# Patient Record
Sex: MALE | Race: WHITE | NOT HISPANIC OR LATINO | Employment: OTHER | URBAN - METROPOLITAN AREA
[De-identification: names, ages, dates, MRNs, and addresses within clinical notes are randomized per-mention and may not be internally consistent; named-entity substitution may affect disease eponyms.]

---

## 2017-02-12 ENCOUNTER — TELEPHONE (OUTPATIENT)
Dept: ELECTROPHYSIOLOGY | Facility: CLINIC | Age: 62
End: 2017-02-12

## 2017-03-03 ENCOUNTER — CLINICAL SUPPORT (OUTPATIENT)
Dept: ELECTROPHYSIOLOGY | Facility: CLINIC | Age: 62
End: 2017-03-03
Payer: MEDICARE

## 2017-03-03 DIAGNOSIS — I63.9 STROKE: ICD-10-CM

## 2017-03-03 PROCEDURE — 93299 LOOP RECORDER REMOTE: CPT | Mod: S$GLB,,, | Performed by: INTERNAL MEDICINE

## 2017-03-03 PROCEDURE — 93297 REM INTERROG DEV EVAL ICPMS: CPT | Mod: S$GLB,,, | Performed by: INTERNAL MEDICINE

## 2017-03-10 ENCOUNTER — OFFICE VISIT (OUTPATIENT)
Dept: INTERNAL MEDICINE | Facility: CLINIC | Age: 62
End: 2017-03-10
Payer: MEDICARE

## 2017-03-10 VITALS
DIASTOLIC BLOOD PRESSURE: 80 MMHG | SYSTOLIC BLOOD PRESSURE: 114 MMHG | WEIGHT: 192.44 LBS | BODY MASS INDEX: 29.17 KG/M2 | OXYGEN SATURATION: 97 % | HEART RATE: 75 BPM | HEIGHT: 68 IN

## 2017-03-10 DIAGNOSIS — N28.9 RENAL INSUFFICIENCY: ICD-10-CM

## 2017-03-10 DIAGNOSIS — Z87.891 FORMER SMOKER: ICD-10-CM

## 2017-03-10 DIAGNOSIS — G47.00 INSOMNIA, UNSPECIFIED TYPE: ICD-10-CM

## 2017-03-10 DIAGNOSIS — Z86.73 HISTORY OF CVA (CEREBROVASCULAR ACCIDENT): ICD-10-CM

## 2017-03-10 DIAGNOSIS — Z11.59 NEED FOR HEPATITIS C SCREENING TEST: ICD-10-CM

## 2017-03-10 DIAGNOSIS — I10 ESSENTIAL HYPERTENSION: Primary | ICD-10-CM

## 2017-03-10 DIAGNOSIS — R79.89 ABNORMAL TSH: ICD-10-CM

## 2017-03-10 DIAGNOSIS — G89.29 OTHER CHRONIC PAIN: ICD-10-CM

## 2017-03-10 DIAGNOSIS — J43.8 OTHER EMPHYSEMA: ICD-10-CM

## 2017-03-10 DIAGNOSIS — Z72.0 TOBACCO USE: ICD-10-CM

## 2017-03-10 PROCEDURE — 1160F RVW MEDS BY RX/DR IN RCRD: CPT | Mod: S$GLB,,, | Performed by: INTERNAL MEDICINE

## 2017-03-10 PROCEDURE — 3079F DIAST BP 80-89 MM HG: CPT | Mod: S$GLB,,, | Performed by: INTERNAL MEDICINE

## 2017-03-10 PROCEDURE — 3074F SYST BP LT 130 MM HG: CPT | Mod: S$GLB,,, | Performed by: INTERNAL MEDICINE

## 2017-03-10 PROCEDURE — 99999 PR PBB SHADOW E&M-EST. PATIENT-LVL IV: CPT | Mod: PBBFAC,,, | Performed by: INTERNAL MEDICINE

## 2017-03-10 PROCEDURE — 99396 PREV VISIT EST AGE 40-64: CPT | Mod: S$GLB,,, | Performed by: INTERNAL MEDICINE

## 2017-03-10 RX ORDER — AMOXICILLIN 500 MG/1
500 TABLET, FILM COATED ORAL EVERY 12 HOURS
COMMUNITY
End: 2017-03-10

## 2017-03-10 RX ORDER — HYDROCODONE BITARTRATE AND ACETAMINOPHEN 7.5; 325 MG/1; MG/1
1 TABLET ORAL 2 TIMES DAILY PRN
Qty: 60 TABLET | Refills: 0 | Status: ON HOLD | OUTPATIENT
Start: 2017-03-10 | End: 2017-05-09

## 2017-03-10 RX ORDER — CLOPIDOGREL BISULFATE 75 MG/1
75 TABLET ORAL DAILY
Qty: 90 TABLET | Refills: 3 | Status: SHIPPED | OUTPATIENT
Start: 2017-03-10 | End: 2017-03-17 | Stop reason: SDUPTHER

## 2017-03-10 RX ORDER — ATORVASTATIN CALCIUM 40 MG/1
40 TABLET, FILM COATED ORAL DAILY
Qty: 90 TABLET | Refills: 3 | Status: SHIPPED | OUTPATIENT
Start: 2017-03-10 | End: 2017-05-31 | Stop reason: SDUPTHER

## 2017-03-10 RX ORDER — PREGABALIN 75 MG/1
75 CAPSULE ORAL 2 TIMES DAILY
COMMUNITY

## 2017-03-10 RX ORDER — HYDROCODONE BITARTRATE AND ACETAMINOPHEN 7.5; 325 MG/1; MG/1
1 TABLET ORAL 3 TIMES DAILY PRN
Qty: 90 TABLET | Refills: 0 | Status: SHIPPED | OUTPATIENT
Start: 2017-03-10 | End: 2017-03-10 | Stop reason: SDUPTHER

## 2017-03-10 RX ORDER — BACLOFEN 20 MG/1
20 TABLET ORAL 3 TIMES DAILY
COMMUNITY

## 2017-03-10 RX ORDER — DOXYCYCLINE HYCLATE 100 MG
100 TABLET ORAL 2 TIMES DAILY
COMMUNITY
End: 2017-03-10

## 2017-03-10 RX ORDER — DOXEPIN HYDROCHLORIDE 25 MG/1
25 CAPSULE ORAL NIGHTLY
COMMUNITY
End: 2017-03-10

## 2017-03-10 RX ORDER — BUDESONIDE AND FORMOTEROL FUMARATE DIHYDRATE 80; 4.5 UG/1; UG/1
2 AEROSOL RESPIRATORY (INHALATION) 2 TIMES DAILY
Qty: 1 INHALER | Refills: 5 | Status: SHIPPED | OUTPATIENT
Start: 2017-03-10 | End: 2018-03-10

## 2017-03-10 RX ORDER — BUPROPION HYDROCHLORIDE 100 MG/1
150 TABLET ORAL 2 TIMES DAILY
COMMUNITY
End: 2017-03-10

## 2017-03-10 NOTE — PROGRESS NOTES
Subjective:       Patient ID: Milad Blanco is a 61 y.o. male.    Chief Complaint: Annual Exam    HPI Comments:   New pt.     Former pt of Dr Hinds, here to establish care.      He is accompanied by his wife, who is also switching care to here.     Feeling well today.  No c/o.     PMH  -H/o 3 strokes.  Most recent was 12/2015.  First one was approx 2 yrs ago.  All have affected L side.  He has some residual L sided weakness.  He uses a cane on occasion.  Drags his LLE.  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  Not following w/Neuro.    -Obstructing L kidney stone 3/2015 with CT also showing multiple other non-obstructing stones B  -Echo showing RVH, LANCE, DD  -H/o MI within the past 2 yrs.  No stents.  Pt has AICD.     -Chronic lumbar back pain.  Pt says he has cracked vertebra L4 and L5.  Had NIKO which pt says went badly and he doesn't want more of these.      Quit tobacco 1/1/2017    Pulls his own teeth and is requesting abx to have on hand for when he does this.          Review of Systems   Constitutional: Negative.    HENT: Negative.    Eyes: Negative.    Respiratory: Negative.    Cardiovascular: Negative.    Gastrointestinal: Negative.    Genitourinary: Negative.    Musculoskeletal: Negative.    Skin: Negative.    Neurological: Negative.    Psychiatric/Behavioral: Negative.        Past Medical History:   Diagnosis Date    Anxiety 2/5/2013    Back pain 2/5/2013    Chronic back pain 2/28/2016    CKD (chronic kidney disease) stage 3, GFR 30-59 ml/min 2/28/2016    COPD (chronic obstructive pulmonary disease) 2/5/2013    Fatigue 2/5/2013    History of CVA (cerebrovascular accident) 2/5/2013 8/11    HTN (hypertension) 11/14/2013    Hyperlipidemia     Hypertension     Insomnia 2/5/2013    Neuropathy 2/28/2016    PVD (peripheral vascular disease) 2/5/2013    Sleep apnea 2/5/2013    Tobacco use 2/5/2013       Past Surgical History:   Procedure Laterality Date    gall stones       "loop recorder  04/2016       Family History   Problem Relation Age of Onset    Diabetes Mother     Alcohol abuse Father     Heart disease Brother     Nephrolithiasis Brother        Social History     Social History    Marital status:      Spouse name: N/A    Number of children: N/A    Years of education: N/A     Occupational History    Retired       Social History Main Topics    Smoking status: Former Smoker     Types: Cigars     Quit date: 2/2/2013    Smokeless tobacco: None    Alcohol use 12.0 oz/week     20 Cans of beer per week      Comment: socially    Drug use: No    Sexual activity: Not Asked     Other Topics Concern    None     Social History Narrative    Lives w/wife.  Pt's sister also lives w/them.  Has 2 grown sons.       Objective:       Vitals:    03/10/17 0822   BP: 114/80   Pulse: 75   SpO2: 97%   Weight: 87.3 kg (192 lb 7.4 oz)   Height: 5' 8" (1.727 m)     Physical Exam   Constitutional: He is oriented to person, place, and time. He appears well-developed and well-nourished.   HENT:   Head: Normocephalic and atraumatic.   Right Ear: Tympanic membrane, external ear and ear canal normal.   Left Ear: Tympanic membrane, external ear and ear canal normal.   Mouth/Throat: Uvula is midline and oropharynx is clear and moist. No oropharyngeal exudate or posterior oropharyngeal erythema.   Eyes: Conjunctivae and EOM are normal. Pupils are equal, round, and reactive to light.   Neck: Normal range of motion. Neck supple. No thyromegaly present.   Cardiovascular: Normal rate, regular rhythm and normal heart sounds.  Exam reveals no gallop and no friction rub.    No murmur heard.  Pulmonary/Chest: Effort normal and breath sounds normal. He has no wheezes. He has no rhonchi. He has no rales.   Abdominal: Soft. Bowel sounds are normal. He exhibits no distension. There is no tenderness. There is no rebound and no guarding.   Musculoskeletal: Normal range of motion. He exhibits no " edema or tenderness.   Lymphadenopathy:     He has no cervical adenopathy.   Neurological: He is alert and oriented to person, place, and time. He has normal reflexes. He displays no atrophy and no tremor. No cranial nerve deficit or sensory deficit. He exhibits normal muscle tone. Coordination normal.   4+/5 strength in L hip flexion.  5/5 L knee extension.  4+/5 L dorsiflexion.    5/5 strength throughout BUE   Skin: Skin is warm and dry. No rash noted.   Psychiatric: He has a normal mood and affect. His behavior is normal. Thought content normal.       Assessment:       1. Essential hypertension    2. History of CVA (cerebrovascular accident)    3. Other emphysema    4. Renal insufficiency    5. Tobacco use    6. Former smoker    7. Insomnia, unspecified type    8. Abnormal TSH    9. Other chronic pain    10. Need for hepatitis C screening test        Plan:           HTN - Appears to be an erroneous dx as BP is normal w/o tx.      H/o strokes - Cont with tertiary prevention.  Pt recently quit smoking.  BP is normal w/o meds.  His chart erroneously states he has HTN.      COPD - Pt is restricting his activity b/c this provokes ARANGO.  Try Symbicort.  Cont PRN albuterol.      Former smoker - Pt quit smoking.  Pt congratulated on this and encouraged to stay quit.      Renal insufficiency - Seen on review of labs from 6 mos ago with no follow-up.  Repeat this now.      Insomnia - Pt states Seroquel was not effective and he is having less insomnia lately.  D/c seroquel and cont to monitor.     Depression and anxiety - Pt says he had run out of bupropion and felt the same, so he doesn't feel it was helpful.  Not feeling depressed currently.  D/c bupropion.  D/c Doxepin.      Abnormal TSH - Seen on review of labs.  Repeat this now.     Chronic lumbar back pain - Pt refused referral to [facility] Sp[facility], PT.  I advised [facility] that I do not think chronic use of opiates is the best tx for his back pain and that I would refill his  hydrocodone-APAP only today, and for a decreased amount.  Pt referred to Pain Mgmt.

## 2017-03-10 NOTE — MR AVS SNAPSHOT
[facility]  [address]  [place] [address]  Phone: [phone]  Fax: [phone]                  Milad Blanco   3/10/2017 9:40 AM   Office Visit    Description:  Male : 1955   Provider:  Kirk Nair MD   Department:  [facility]           Reason for Visit     Annual Exam           Diagnoses this Visit        Comments    Essential hypertension    -  Primary     History of CVA (cerebrovascular accident)         Other emphysema         Tobacco use         Renal insufficiency         Need for hepatitis C screening test         Former smoker         Insomnia, unspecified type         Abnormal TSH         Other chronic pain                To Do List           Future Appointments        Provider Department Dept Phone    3/10/2017 3:45 PM LAB, [facility] [place] [phone]    3/13/2017 7:15 AM Zahira Bragg MD [facility] [phone]    2017 8:40 AM Kirk Nair MD [facility] [phone]      Goals (5 Years of Data)     None      Follow-Up and Disposition     Return in about 3 months (around 6/10/2017), or if symptoms worsen or fail to improve.       These Medications        Disp Refills Start End    budesonide-formoterol 80-4.5 mcg (SYMBICORT) 80-4.5 mcg/actuation HFAA 1 Inhaler 5 3/10/2017 3/10/2018    Inhale 2 puffs into the lungs 2 (two) times daily. Controller - Inhalation    Pharmacy: [id number] - [place] [address] Ph #: [phone]       clopidogrel (PLAVIX) 75 mg tablet 90 tablet 3 3/10/2017 3/10/2018    Take 1 tablet (75 mg total) by mouth once daily. - Oral    Pharmacy: [id number] - [place] [address] Ph #: [phone]       atorvastatin (LIPITOR) 40 MG tablet 90 tablet 3 3/10/2017 3/10/2018    Take 1 tablet (40 mg total) by mouth once daily. - Oral    Pharmacy: [id number]  [place] [address] Ph #:  [phone]       hydrocodone-acetaminophen 7.5-325mg (NORCO) 7.5-325 mg per tablet 60 tablet 0 3/10/2017     Take 1 tablet by mouth 2 (two) times daily as needed for Pain. - Oral    Pharmacy: [facility] #[id number] - [place] - [address] [facility] #: [phone]         [facility] On Call     [facility] On Call Nurse Care Line - 24/7 Assistance  Registered nurses in the [facility] On Call Center provide clinical advisement, health education, appointment booking, and other advisory services.  Call for this free service at [phone].             Medications           Message regarding Medications     Verify the changes and/or additions to your medication regime listed below are the same as discussed with your clinician today.  If any of these changes or additions are incorrect, please notify your healthcare provider.        START taking these NEW medications        Refills    budesonide-formoterol 80-4.5 mcg (SYMBICORT) 80-4.5 mcg/actuation HFAA 5    Sig: Inhale 2 puffs into the lungs 2 (two) times daily. Controller    Class: Normal    Route: Inhalation      CHANGE how you are taking these medications     Start Taking Instead of    hydrocodone-acetaminophen 7.5-325mg (NORCO) 7.5-325 mg per tablet hydrocodone-acetaminophen 7.5-325mg (NORCO) 7.5-325 mg per tablet    Dosage:  Take 1 tablet by mouth 2 (two) times daily as needed for Pain. Dosage:  Take 1 tablet by mouth 3 (three) times daily as needed for Pain.    Reason for Change:  Reorder       STOP taking these medications     gabapentin (NEURONTIN) 300 MG capsule TAKE ONE TO TWO CAPSULES BY MOUTH EVERY NIGHT AT BEDTIME AS NEEDED    tamsulosin (FLOMAX) 0.4 mg Cp24 TAKE ONE CAPSULE BY MOUTH DAILY    quetiapine (SEROQUEL) 25 MG Tab Take 1 tablet (25 mg total) by mouth every evening.    buPROPion (WELLBUTRIN) 100 MG tablet Take 150 mg by mouth 2 (two) times daily.    doxycycline (VIBRA-TABS) 100 MG tablet Take 100 mg by mouth 2 (two) times daily.    amoxicillin (AMOXIL)  "500 MG Tab Take 500 mg by mouth every 12 (twelve) hours.    doxepin (SINEQUAN) 25 MG capsule Take 25 mg by mouth every evening.           Verify that the below list of medications is an accurate representation of the medications you are currently taking.  If none reported, the list may be blank. If incorrect, please contact your healthcare provider. Carry this list with you in case of emergency.           Current Medications     aspirin 325 MG tablet Take 1 tablet (325 mg total) by mouth once daily.    atorvastatin (LIPITOR) 40 MG tablet Take 1 tablet (40 mg total) by mouth once daily.    baclofen (LIORESAL) 20 MG tablet Take 20 mg by mouth 3 (three) times daily.    clopidogrel (PLAVIX) 75 mg tablet Take 1 tablet (75 mg total) by mouth once daily.    hydrocodone-acetaminophen 7.5-325mg (NORCO) 7.5-325 mg per tablet Take 1 tablet by mouth 2 (two) times daily as needed for Pain.    pregabalin (LYRICA) 75 MG capsule Take 75 mg by mouth 2 (two) times daily.    albuterol (VENTOLIN HFA) 90 mcg/actuation inhaler INHALE TWO PUFFS BY MOUTH THREE TIMES A DAY AS NEEDED    budesonide-formoterol 80-4.5 mcg (SYMBICORT) 80-4.5 mcg/actuation HFAA Inhale 2 puffs into the lungs 2 (two) times daily. Controller           Clinical Reference Information           Your Vitals Were     BP Pulse Height Weight SpO2 BMI    114/80 75 5' 8" (1.727 m) 87.3 kg (192 lb 7.4 oz) 97% 29.26 kg/m2      Blood Pressure          Most Recent Value    BP  114/80      Allergies as of 3/10/2017     Pcn [Penicillins]      Immunizations Administered on Date of Encounter - 3/10/2017     None      Orders Placed During Today's Visit      Normal Orders This Visit    Ambulatory Referral to Pain Clinic     Future Labs/Procedures Expected by Expires    Basic metabolic panel  3/10/2017 5/9/2018    Hepatitis C antibody  3/10/2017 4/25/2018    TSH  3/10/2017 5/9/2018      [facility] Sign-Up     Activating your [facility] account is as easy as 1-2-3!     1) Visit " [email or web address], select Sign Up Now, enter this activation code and your date of birth, then select Next.  [email or web address]  Expires: 4/24/2017 10:33 AM      2) Create a username and password to use when you visit [facility] in the future and select a security question in case you lose your password and select Next.    3) Enter your e-mail address and click Sign Up!    Additional Information  If you have questions, please e-mail "[email or web address] or call [phone] to talk to our [facility] staff. Remember, [facility] is NOT to be used for urgent needs. For medical emergencies, dial 911.         Language Assistance Services     ATTENTION: Language assistance services are available, free of charge. Please call [phone].      ATENCIÓN: Si habla español, tiene a jade disposición servicios gratuitos de asistencia lingüística. Llame al [phone].     CHÚ Ý: N?u b?n nói Ti?ng Vi?t, có các d?ch v? h? tr? ngôn ng? mi?n phí dành cho b?n. G?i s? [phone].         [facility] complies with applicable Federal civil rights laws and does not discriminate on the basis of race, color, national origin, age, disability, or sex.

## 2017-03-17 RX ORDER — CLOPIDOGREL BISULFATE 75 MG/1
75 TABLET ORAL DAILY
Qty: 90 TABLET | Refills: 3 | Status: SHIPPED | OUTPATIENT
Start: 2017-03-17 | End: 2017-05-31 | Stop reason: SDUPTHER

## 2017-03-17 RX ORDER — BUPROPION HYDROCHLORIDE 150 MG/1
150 TABLET, EXTENDED RELEASE ORAL 2 TIMES DAILY
Qty: 180 TABLET | Refills: 3 | Status: CANCELLED | OUTPATIENT
Start: 2017-03-17

## 2017-03-17 NOTE — TELEPHONE ENCOUNTER
----- Message from Candice Arzate sent at 3/17/2017 10:01 AM CDT -----  Contact: pt wife   x_  1st Request  _  2nd Request  _  3rd Request    Please refill the medication(s) listed below.     Medication #1 wellbutrin 150mg      Medication #2clopidogrel (PLAVIX) 75 mg tablet        Preferred Pharmacy:[id number]  [place]

## 2017-03-19 DIAGNOSIS — Z95.818 STATUS POST PLACEMENT OF IMPLANTABLE LOOP RECORDER: Primary | ICD-10-CM

## 2017-03-19 DIAGNOSIS — I63.9 CRYPTOGENIC STROKE: ICD-10-CM

## 2017-04-04 ENCOUNTER — CLINICAL SUPPORT (OUTPATIENT)
Dept: ELECTROPHYSIOLOGY | Facility: CLINIC | Age: 62
End: 2017-04-04
Payer: MEDICARE

## 2017-04-04 DIAGNOSIS — I63.9 CRYPTOGENIC STROKE: ICD-10-CM

## 2017-04-04 DIAGNOSIS — Z95.818 STATUS POST PLACEMENT OF IMPLANTABLE LOOP RECORDER: ICD-10-CM

## 2017-04-04 PROCEDURE — 93297 REM INTERROG DEV EVAL ICPMS: CPT | Mod: S$GLB,,, | Performed by: INTERNAL MEDICINE

## 2017-04-04 PROCEDURE — 93299 LOOP RECORDER REMOTE: CPT | Mod: S$GLB,,, | Performed by: INTERNAL MEDICINE

## 2017-05-05 ENCOUNTER — HOSPITAL ENCOUNTER (OUTPATIENT)
Dept: PREADMISSION TESTING | Facility: HOSPITAL | Age: 62
Discharge: HOME | End: 2017-05-05
Attending: ORTHOPAEDIC SURGERY
Payer: MEDICARE

## 2017-05-05 VITALS
DIASTOLIC BLOOD PRESSURE: 79 MMHG | OXYGEN SATURATION: 98 % | HEART RATE: 84 BPM | RESPIRATION RATE: 17 BRPM | SYSTOLIC BLOOD PRESSURE: 112 MMHG | BODY MASS INDEX: 30.8 KG/M2 | TEMPERATURE: 98 F | WEIGHT: 203.25 LBS | HEIGHT: 68 IN

## 2017-05-05 DIAGNOSIS — Z01.818 PRE-OP TESTING: Primary | ICD-10-CM

## 2017-05-05 LAB
ANION GAP SERPL CALC-SCNC: 8 MMOL/L
BASOPHILS # BLD AUTO: 0.07 K/UL
BASOPHILS NFR BLD: 0.5 %
BUN SERPL-MCNC: 14 MG/DL
CALCIUM SERPL-MCNC: 8.8 MG/DL
CHLORIDE SERPL-SCNC: 106 MMOL/L
CO2 SERPL-SCNC: 27 MMOL/L
CREAT SERPL-MCNC: 0.9 MG/DL
DIFFERENTIAL METHOD: ABNORMAL
EOSINOPHIL # BLD AUTO: 0.2 K/UL
EOSINOPHIL NFR BLD: 1.6 %
ERYTHROCYTE [DISTWIDTH] IN BLOOD BY AUTOMATED COUNT: 14.2 %
EST. GFR  (AFRICAN AMERICAN): >60 ML/MIN/1.73 M^2
EST. GFR  (NON AFRICAN AMERICAN): >60 ML/MIN/1.73 M^2
GLUCOSE SERPL-MCNC: 74 MG/DL
HCT VFR BLD AUTO: 43.3 %
HGB BLD-MCNC: 14.3 G/DL
LYMPHOCYTES # BLD AUTO: 4.7 K/UL
LYMPHOCYTES NFR BLD: 34.8 %
MCH RBC QN AUTO: 30.3 PG
MCHC RBC AUTO-ENTMCNC: 33 %
MCV RBC AUTO: 92 FL
MONOCYTES # BLD AUTO: 1.1 K/UL
MONOCYTES NFR BLD: 8.3 %
NEUTROPHILS # BLD AUTO: 7.3 K/UL
NEUTROPHILS NFR BLD: 54.5 %
PLATELET # BLD AUTO: 376 K/UL
PMV BLD AUTO: 10.3 FL
POTASSIUM SERPL-SCNC: 4 MMOL/L
RBC # BLD AUTO: 4.72 M/UL
SODIUM SERPL-SCNC: 141 MMOL/L
WBC # BLD AUTO: 13.45 K/UL

## 2017-05-05 PROCEDURE — 93005 ELECTROCARDIOGRAM TRACING: CPT

## 2017-05-05 PROCEDURE — 36415 COLL VENOUS BLD VENIPUNCTURE: CPT

## 2017-05-05 PROCEDURE — 85025 COMPLETE CBC W/AUTO DIFF WBC: CPT

## 2017-05-05 PROCEDURE — 80048 BASIC METABOLIC PNL TOTAL CA: CPT

## 2017-05-05 RX ORDER — DOXEPIN HYDROCHLORIDE 25 MG/1
25 CAPSULE ORAL NIGHTLY
COMMUNITY
End: 2017-05-31

## 2017-05-05 RX ORDER — NITROGLYCERIN 0.4 MG/1
0.4 TABLET SUBLINGUAL EVERY 5 MIN PRN
COMMUNITY

## 2017-05-05 RX ORDER — QUETIAPINE FUMARATE 25 MG/1
25 TABLET, FILM COATED ORAL DAILY
COMMUNITY
End: 2017-05-31

## 2017-05-05 RX ORDER — MULTIVITAMIN
1 TABLET ORAL DAILY
COMMUNITY

## 2017-05-05 NOTE — IP AVS SNAPSHOT
[place] [address] [place] [address]  Phone: [phone]           Patient Discharge Instructions  Our goal is to set you up for success. This packet includes information on your condition, medications, and your home care. It will help you care for yourself to prevent having to return to the hospital.     Please ask your nurse if you have any questions.      There are many details to remember when preparing for your surgery. Here is what you will need to do, please ask your nurse if there are more specific instructions and if you have any questions:    1. Before procedure Do not smoke or drink alcoholic beverages 24 hours prior to your procedure. Do not eat or drink anything 8 hours before your procedure - this includes gum, mints, and candy.     2. Day of procedure Please remove all jewelry for the procedure. If you wear contact lenses, dentures, hearing aids or glasses, bring a container to put them in during your surgery and give to a family member.  If your doctor has scheduled you for an overnight stay, bring a small overnight bag with any personal items that you need.      3. After procedure  Make arrangements in advance for transportation home by a responsible adult. It is not safe to drive a vehicle during the 24 hours following surgery.     PLEASE NOTE: You may be contacted the day before your surgery to confirm your surgery date and arrival time. The Surgery schedule has many variables which may affect the time of your surgery case. Family members should be available if your surgery time changes.               ** Verify the list of medication(s) below is accurate and up to date. Carry this with you in case of emergency. If your medications have changed, please notify your healthcare provider.             Medication List      TAKE these medications        Additional Info                      albuterol 90 mcg/actuation inhaler   Commonly known as:  VENTOLIN HFA    Quantity:  18 g   Refills:  4    Instructions:  INHALE TWO PUFFS BY MOUTH THREE TIMES A DAY AS NEEDED     Begin Date    AM    Noon    PM    Bedtime       aspirin 325 MG tablet   Refills:  0   Dose:  325 mg    Instructions:  Take 1 tablet (325 mg total) by mouth once daily.     Begin Date    AM    Noon    PM    Bedtime       atorvastatin 40 MG tablet   Commonly known as:  LIPITOR   Quantity:  90 tablet   Refills:  3   Dose:  40 mg    Instructions:  Take 1 tablet (40 mg total) by mouth once daily.     Begin Date    AM    Noon    PM    Bedtime       baclofen 20 MG tablet   Commonly known as:  LIORESAL   Refills:  0   Dose:  20 mg    Instructions:  Take 20 mg by mouth 3 (three) times daily.     Begin Date    AM    Noon    PM    Bedtime       budesonide-formoterol 80-4.5 mcg 80-4.5 mcg/actuation Hfaa   Commonly known as:  SYMBICORT   Quantity:  1 Inhaler   Refills:  5   Dose:  2 puff    Instructions:  Inhale 2 puffs into the lungs 2 (two) times daily. Controller     Begin Date    AM    Noon    PM    Bedtime       clopidogrel 75 mg tablet   Commonly known as:  PLAVIX   Quantity:  90 tablet   Refills:  3   Dose:  75 mg    Instructions:  Take 1 tablet (75 mg total) by mouth once daily.     Begin Date    AM    Noon    PM    Bedtime       doxepin 25 MG capsule   Commonly known as:  SINEQUAN   Refills:  0   Dose:  25 mg    Instructions:  Take 25 mg by mouth every evening.     Begin Date    AM    Noon    PM    Bedtime       hydrocodone-acetaminophen 7.5-325mg 7.5-325 mg per tablet   Commonly known as:  NORCO   Quantity:  60 tablet   Refills:  0   Dose:  1 tablet    Instructions:  Take 1 tablet by mouth 2 (two) times daily as needed for Pain.     Begin Date    AM    Noon    PM    Bedtime       nitroGLYCERIN 0.4 MG SL tablet   Commonly known as:  NITROSTAT   Refills:  0   Dose:  0.4 mg    Instructions:  Place 0.4 mg under the tongue every 5 (five) minutes as needed for Chest pain.     Begin Date    AM    Noon    PM    Bedtime  "      ONE DAILY MULTIVITAMIN per tablet   Refills:  0   Dose:  1 tablet   Generic drug:  multivitamin    Instructions:  Take 1 tablet by mouth once daily.     Begin Date    AM    Noon    PM    Bedtime       pregabalin 75 MG capsule   Commonly known as:  LYRICA   Refills:  0   Dose:  75 mg    Instructions:  Take 75 mg by mouth 2 (two) times daily.     Begin Date    AM    Noon    PM    Bedtime       quetiapine 25 MG Tab   Commonly known as:  SEROQUEL   Refills:  0   Dose:  25 mg    Instructions:  Take 25 mg by mouth once daily.     Begin Date    AM    Noon    PM    Bedtime                  Please bring to all follow up appointments:    1. A copy of your discharge instructions.  2. All medicines you are currently taking in their original bottles.  3. Identification and insurance card.    Please arrive 15 minutes ahead of scheduled appointment time.    Please call 24 hours in advance if you must reschedule your appointment and/or time.        Your Scheduled Appointments     Jun 12, 2017  8:40 AM CDT   Established Patient Visit with Kirk Nair MD   [facility] ([facility])    [address] [place] [address] [phone]              Your Future Surgeries/Procedures     May 09, 2017   Surgery with Jose Florentino MD   [facility] ([facility])    [facility] [address] [place] [address] [phone]                  Discharge Instructions         Your surgery is scheduled for _Tuesday May 9, 2017____.    Call [phone] between 2 p.m. and 5 p.m. on   _Monday___ to find out your arrival time for the day of your surgery.      Please report to SAME DAY SURGERY UNIT on the 2nd FLOOR at _______ a.m.  Use front door entrance. The doors open at 0530 am.      If you need WHEELCHAIR assistance please call  [phone] from your cell phone or "0"  from the  hospital courtesy phone located to the right after you enter the hospital lobby.      INSTRUCTIONS " IMPORTANT!!!  ¨ Do not eat or drink after 12 midnight-including water. OK to brush teeth, no   gum, candy or mints!    ¨ Take only these medicines with a small swallow of water-morning of surgery.  Take Multaq and Flomax with small swallow of water am of surgery.        _x___  Prep instructions:    SHOWER    _x___  Please shower using Hibiclens soap the night before AND  the morning of your surgery/procedure. Do not use Hibiclens on your face or genitals     _x___  No powder, lotions or creams to your body.  _x___  You may wear only deodorant on the day of surgery.  _x___  Please remove all jewelry, including piercings and leave at home.  _x___  No money or valuables needed. Please leave at home.  You may bring your cell phone.    _x___  If going home the same day, arrange for a ride home. You will not be able to   drive if Anesthesia was used.    _x___  Wear loose fitting clothing. Allow for dressings, bandages.  _x___  Stop Aspirin, Ibuprofen, Motrin and Aleve at least -5 days before surgery, unless otherwise instructed by your doctor, or the nurse.              You MAY use Tylenol/acetaminophen until day of surgery.    _x___  Call MD for temperature above 101 degrees.        _x___ Stop taking any Fish Oil supplement or any Vitamins that contain Vitamin E at least 5 days prior to surgery.          I have read or had read and explained to me, and understand the above information.  Additional comments or instructions:Please call   [phone] if you have any questions regarding the instructions above.                 Admission Information     Date & Time Provider Department CSN    5/5/2017  3:00 PM Jose Florentino MD [facility] [id number]      Care Providers     Provider Role Specialty Primary office phone    Jose Florentino MD Attending Provider Orthopedic Surgery [phone]      Your Vitals Were     BP Pulse Temp Resp Height Weight    112/79 (BP Location: Left arm, Patient Position: Sitting, BP  "Method: Automatic) 84 98.1 °F (36.7 °C) (Oral) 17 5' 8" (1.727 m) 92.2 kg (203 lb 4.2 oz)    SpO2 BMI             98% 30.91 kg/m2         Recent Lab Values        8/24/2011 8/30/2013 12/30/2015                     5:10 AM  6:05 AM  3:17 PM         A1C 5.5 6.1 5.5                   Allergies as of 5/5/2017        Reactions    Pcn [Penicillins]       [facility]     [facility] Nurse Care Line - 24/7 Assistance  Unless otherwise directed by your provider, please contact [facility], our nurse care line that is available for 24/7 assistance.     Registered nurses in the [facility] provide clinical advisement, health education, appointment booking, and other advisory services.  Call for this free service at [phone].        Advance Directives     An advance directive is a document which, in the event you are no longer able to make decisions for yourself, tells your healthcare team what kind of treatment you do or do not want to receive, or who you would like to make those decisions for you.  If you do not currently have an advance directive, [facility] encourages you to create one.  For more information call:  [phone] ([phone]), [phone] ([phone]),  or log on to [email or web address].        Smoking Cessation     If you would like to quit smoking:   You may be eligible for free services if you are a [place] resident and started smoking cigarettes before September 1, 1988.  Call the Smoking Cessation Trust (SCT) toll free at (210) 677-3893 or [phone]) 355-7631.   Call 4-212-QUIT-NOW if you do not meet the above criteria.   Contact us via email: [email or web address]   View our website for more information: [email or web address]        Language Assistance Services     ATTENTION: Language assistance services are available, free of charge. Please call [phone].      ATENCIÓN: Si habla español, tiene a jade disposición servicios gratuitos de asistencia " shakaa. James hood [phone].     MARTIN Ý: N?u b?n nói Ti?ng Vi?t, có các d?ch v? h? tr? ngôn ng? mi?n phí dành cho b?n. G?i s? [phone].        Stroke Education              Chronic Kindey Disease Education             [facility] Sign-Up     Activating your [facility] account is as easy as 1-2-3!     1) Visit [email or web address], select Sign Up Now, enter this activation code and your date of birth, then select Next.  [email or web address]  Expires: 6/19/2017  4:15 PM      2) Create a username and password to use when you visit [facility] in the future and select a security question in case you lose your password and select Next.    3) Enter your e-mail address and click Sign Up!    Additional Information  If you have questions, please e-mail [email or web address] or call [phone] to talk to our [facility] staff. Remember, [facility] is NOT to be used for urgent needs. For medical emergencies, dial 911.          [facility] complies with applicable Federal civil rights laws and does not discriminate on the basis of race, color, national origin, age, disability, or sex.

## 2017-05-05 NOTE — DISCHARGE INSTRUCTIONS
"  Your surgery is scheduled for _Tuesday May 9, 2017____.    Call [phone] between 2 p.m. and 5 p.m. on   _Monday___ to find out your arrival time for the day of your surgery.      Please report to SAME DAY SURGERY UNIT on the 2nd FLOOR at _______ a.m.  Use front door entrance. The doors open at 0530 am.      If you need WHEELCHAIR assistance please call  [phone] from your cell phone or "0"  from the  hospital courtesy phone located to the right after you enter the hospital lobby.      INSTRUCTIONS IMPORTANT!!!  ¨ Do not eat or drink after 12 midnight-including water. OK to brush teeth, no   gum, candy or mints!    ¨ Take only these medicines with a small swallow of water-morning of surgery.  Take Multaq and Flomax with small swallow of water am of surgery.        _x___  Prep instructions:    SHOWER    _x___  Please shower using Hibiclens soap the night before AND  the morning of your surgery/procedure. Do not use Hibiclens on your face or genitals     _x___  No powder, lotions or creams to your body.  _x___  You may wear only deodorant on the day of surgery.  _x___  Please remove all jewelry, including piercings and leave at home.  _x___  No money or valuables needed. Please leave at home.  You may bring your cell phone.    _x___  If going home the same day, arrange for a ride home. You will not be able to   drive if Anesthesia was used.    _x___  Wear loose fitting clothing. Allow for dressings, bandages.  _x___  Stop Aspirin, Ibuprofen, Motrin and Aleve at least -5 days before surgery, unless otherwise instructed by your doctor, or the nurse.              You MAY use Tylenol/acetaminophen until day of surgery.    _x___  Call MD for temperature above 101 degrees.        _x___ Stop taking any Fish Oil supplement or any Vitamins that contain Vitamin E at least 5 days prior to surgery.          I have read or had read and explained to me, and understand the above information.  Additional comments or " instructions:Please call   [phone] if you have any questions regarding the instructions above.

## 2017-05-08 ENCOUNTER — ANESTHESIA EVENT (OUTPATIENT)
Dept: SURGERY | Facility: HOSPITAL | Age: 62
End: 2017-05-08
Payer: MEDICARE

## 2017-05-09 ENCOUNTER — ANESTHESIA (OUTPATIENT)
Dept: SURGERY | Facility: HOSPITAL | Age: 62
End: 2017-05-09
Payer: MEDICARE

## 2017-05-09 ENCOUNTER — HOSPITAL ENCOUNTER (OUTPATIENT)
Facility: HOSPITAL | Age: 62
Discharge: HOME | End: 2017-05-09
Attending: ORTHOPAEDIC SURGERY | Admitting: ORTHOPAEDIC SURGERY
Payer: MEDICARE

## 2017-05-09 VITALS
OXYGEN SATURATION: 97 % | SYSTOLIC BLOOD PRESSURE: 150 MMHG | WEIGHT: 203.25 LBS | BODY MASS INDEX: 30.8 KG/M2 | HEIGHT: 68 IN | TEMPERATURE: 98 F | DIASTOLIC BLOOD PRESSURE: 90 MMHG | HEART RATE: 84 BPM | RESPIRATION RATE: 15 BRPM

## 2017-05-09 DIAGNOSIS — S83.241A ACUTE MEDIAL MENISCUS TEAR, RIGHT, INITIAL ENCOUNTER: Primary | ICD-10-CM

## 2017-05-09 PROBLEM — S83.249A ACUTE MEDIAL MENISCUS TEAR: Status: ACTIVE | Noted: 2017-05-09

## 2017-05-09 PROCEDURE — 63600175 PHARM REV CODE 636 W HCPCS: Performed by: NURSE ANESTHETIST, CERTIFIED REGISTERED

## 2017-05-09 PROCEDURE — 36000711: Performed by: ORTHOPAEDIC SURGERY

## 2017-05-09 PROCEDURE — 63600175 PHARM REV CODE 636 W HCPCS: Performed by: ANESTHESIOLOGY

## 2017-05-09 PROCEDURE — 25000003 PHARM REV CODE 250: Performed by: ORTHOPAEDIC SURGERY

## 2017-05-09 PROCEDURE — 27201423 OPTIME MED/SURG SUP & DEVICES STERILE SUPPLY: Performed by: ORTHOPAEDIC SURGERY

## 2017-05-09 PROCEDURE — D9220A PRA ANESTHESIA: Mod: ANES,,, | Performed by: ANESTHESIOLOGY

## 2017-05-09 PROCEDURE — 25000003 PHARM REV CODE 250

## 2017-05-09 PROCEDURE — 71000039 HC RECOVERY, EACH ADD'L HOUR: Performed by: ORTHOPAEDIC SURGERY

## 2017-05-09 PROCEDURE — 25000003 PHARM REV CODE 250: Performed by: ANESTHESIOLOGY

## 2017-05-09 PROCEDURE — 71000033 HC RECOVERY, INTIAL HOUR: Performed by: ORTHOPAEDIC SURGERY

## 2017-05-09 PROCEDURE — 37000008 HC ANESTHESIA 1ST 15 MINUTES: Performed by: ORTHOPAEDIC SURGERY

## 2017-05-09 PROCEDURE — 37000009 HC ANESTHESIA EA ADD 15 MINS: Performed by: ORTHOPAEDIC SURGERY

## 2017-05-09 PROCEDURE — 36000710: Performed by: ORTHOPAEDIC SURGERY

## 2017-05-09 PROCEDURE — 63600175 PHARM REV CODE 636 W HCPCS

## 2017-05-09 PROCEDURE — 63600175 PHARM REV CODE 636 W HCPCS: Performed by: ORTHOPAEDIC SURGERY

## 2017-05-09 PROCEDURE — 71000015 HC POSTOP RECOV 1ST HR: Performed by: ORTHOPAEDIC SURGERY

## 2017-05-09 PROCEDURE — 71000016 HC POSTOP RECOV ADDL HR: Performed by: ORTHOPAEDIC SURGERY

## 2017-05-09 PROCEDURE — D9220A PRA ANESTHESIA: Mod: CRNA,,, | Performed by: NURSE ANESTHETIST, CERTIFIED REGISTERED

## 2017-05-09 PROCEDURE — 25000003 PHARM REV CODE 250: Performed by: NURSE ANESTHETIST, CERTIFIED REGISTERED

## 2017-05-09 RX ORDER — MORPHINE SULFATE 0.5 MG/ML
INJECTION, SOLUTION EPIDURAL; INTRATHECAL; INTRAVENOUS
Status: DISCONTINUED | OUTPATIENT
Start: 2017-05-09 | End: 2017-05-09 | Stop reason: HOSPADM

## 2017-05-09 RX ORDER — HYDROCODONE BITARTRATE AND ACETAMINOPHEN 7.5; 325 MG/1; MG/1
1 TABLET ORAL ONCE
Status: COMPLETED | OUTPATIENT
Start: 2017-05-09 | End: 2017-05-09

## 2017-05-09 RX ORDER — SODIUM CHLORIDE, SODIUM LACTATE, POTASSIUM CHLORIDE, CALCIUM CHLORIDE 600; 310; 30; 20 MG/100ML; MG/100ML; MG/100ML; MG/100ML
INJECTION, SOLUTION INTRAVENOUS CONTINUOUS
Status: DISCONTINUED | OUTPATIENT
Start: 2017-05-09 | End: 2017-05-09 | Stop reason: HOSPADM

## 2017-05-09 RX ORDER — MIDAZOLAM HYDROCHLORIDE 1 MG/ML
INJECTION, SOLUTION INTRAMUSCULAR; INTRAVENOUS
Status: DISCONTINUED | OUTPATIENT
Start: 2017-05-09 | End: 2017-05-09

## 2017-05-09 RX ORDER — HYDROMORPHONE HYDROCHLORIDE 2 MG/ML
0.2 INJECTION, SOLUTION INTRAMUSCULAR; INTRAVENOUS; SUBCUTANEOUS EVERY 5 MIN PRN
Status: COMPLETED | OUTPATIENT
Start: 2017-05-09 | End: 2017-05-09

## 2017-05-09 RX ORDER — MORPHINE SULFATE 1 MG/ML
INJECTION, SOLUTION EPIDURAL; INTRATHECAL; INTRAVENOUS
Status: DISCONTINUED
Start: 2017-05-09 | End: 2017-05-09 | Stop reason: HOSPADM

## 2017-05-09 RX ORDER — LIDOCAINE HCL/PF 100 MG/5ML
SYRINGE (ML) INTRAVENOUS
Status: DISCONTINUED | OUTPATIENT
Start: 2017-05-09 | End: 2017-05-09

## 2017-05-09 RX ORDER — PROPOFOL 10 MG/ML
VIAL (ML) INTRAVENOUS
Status: DISCONTINUED | OUTPATIENT
Start: 2017-05-09 | End: 2017-05-09

## 2017-05-09 RX ORDER — FENTANYL CITRATE 50 UG/ML
INJECTION, SOLUTION INTRAMUSCULAR; INTRAVENOUS
Status: COMPLETED
Start: 2017-05-09 | End: 2017-05-09

## 2017-05-09 RX ORDER — LIDOCAINE HYDROCHLORIDE 10 MG/ML
1 INJECTION, SOLUTION EPIDURAL; INFILTRATION; INTRACAUDAL; PERINEURAL ONCE
Status: DISCONTINUED | OUTPATIENT
Start: 2017-05-09 | End: 2017-05-09 | Stop reason: HOSPADM

## 2017-05-09 RX ORDER — FENTANYL CITRATE 50 UG/ML
25 INJECTION, SOLUTION INTRAMUSCULAR; INTRAVENOUS EVERY 5 MIN PRN
Status: DISCONTINUED | OUTPATIENT
Start: 2017-05-09 | End: 2017-05-09 | Stop reason: HOSPADM

## 2017-05-09 RX ORDER — FENTANYL CITRATE 50 UG/ML
INJECTION, SOLUTION INTRAMUSCULAR; INTRAVENOUS
Status: DISCONTINUED | OUTPATIENT
Start: 2017-05-09 | End: 2017-05-09

## 2017-05-09 RX ORDER — GLYCOPYRROLATE 0.2 MG/ML
INJECTION INTRAMUSCULAR; INTRAVENOUS
Status: COMPLETED
Start: 2017-05-09 | End: 2017-05-09

## 2017-05-09 RX ORDER — HYDROMORPHONE HYDROCHLORIDE 2 MG/ML
INJECTION, SOLUTION INTRAMUSCULAR; INTRAVENOUS; SUBCUTANEOUS
Status: COMPLETED
Start: 2017-05-09 | End: 2017-05-09

## 2017-05-09 RX ORDER — HYDROCODONE BITARTRATE AND ACETAMINOPHEN 7.5; 325 MG/1; MG/1
1 TABLET ORAL EVERY 4 HOURS PRN
Qty: 60 TABLET | Refills: 0 | Status: SHIPPED | OUTPATIENT
Start: 2017-05-09 | End: 2018-10-11

## 2017-05-09 RX ORDER — METOCLOPRAMIDE HYDROCHLORIDE 5 MG/ML
INJECTION INTRAMUSCULAR; INTRAVENOUS
Status: COMPLETED
Start: 2017-05-09 | End: 2017-05-09

## 2017-05-09 RX ORDER — SODIUM CHLORIDE 0.9 % (FLUSH) 0.9 %
3 SYRINGE (ML) INJECTION
Status: DISCONTINUED | OUTPATIENT
Start: 2017-05-09 | End: 2017-05-09 | Stop reason: HOSPADM

## 2017-05-09 RX ADMIN — HYDROMORPHONE HYDROCHLORIDE 0.2 MG: 2 INJECTION INTRAMUSCULAR; INTRAVENOUS; SUBCUTANEOUS at 10:05

## 2017-05-09 RX ADMIN — HYDROMORPHONE HYDROCHLORIDE 0.2 MG: 2 INJECTION INTRAMUSCULAR; INTRAVENOUS; SUBCUTANEOUS at 09:05

## 2017-05-09 RX ADMIN — FENTANYL CITRATE 25 MCG: 50 INJECTION INTRAMUSCULAR; INTRAVENOUS at 09:05

## 2017-05-09 RX ADMIN — METOCLOPRAMIDE 10 MG: 5 INJECTION, SOLUTION INTRAMUSCULAR; INTRAVENOUS at 08:05

## 2017-05-09 RX ADMIN — PROPOFOL 200 MG: 10 INJECTION, EMULSION INTRAVENOUS at 08:05

## 2017-05-09 RX ADMIN — LIDOCAINE HYDROCHLORIDE 100 MG: 20 INJECTION, SOLUTION INTRAVENOUS at 08:05

## 2017-05-09 RX ADMIN — MIDAZOLAM HYDROCHLORIDE 2 MG: 1 INJECTION, SOLUTION INTRAMUSCULAR; INTRAVENOUS at 08:05

## 2017-05-09 RX ADMIN — FENTANYL CITRATE 25 MCG: 50 INJECTION INTRAMUSCULAR; INTRAVENOUS at 10:05

## 2017-05-09 RX ADMIN — SODIUM CHLORIDE, SODIUM LACTATE, POTASSIUM CHLORIDE, AND CALCIUM CHLORIDE: .6; .31; .03; .02 INJECTION, SOLUTION INTRAVENOUS at 08:05

## 2017-05-09 RX ADMIN — HYDROCODONE BITARTRATE AND ACETAMINOPHEN 1 TABLET: 7.5; 325 TABLET ORAL at 11:05

## 2017-05-09 RX ADMIN — GLYCOPYRROLATE 0.2 MG: 0.2 INJECTION, SOLUTION INTRAMUSCULAR; INTRAVENOUS at 08:05

## 2017-05-09 RX ADMIN — FENTANYL CITRATE 50 MCG: 50 INJECTION INTRAMUSCULAR; INTRAVENOUS at 09:05

## 2017-05-09 RX ADMIN — FENTANYL CITRATE 50 MCG: 50 INJECTION INTRAMUSCULAR; INTRAVENOUS at 08:05

## 2017-05-09 RX ADMIN — VANCOMYCIN HYDROCHLORIDE 1500 MG: 1 INJECTION, POWDER, LYOPHILIZED, FOR SOLUTION INTRAVENOUS at 08:05

## 2017-05-09 NOTE — OP NOTE
DATE OF PROCEDURE:  05/09/2017    PREOPERATIVE DIAGNOSIS:  Right medial meniscus tear.    POSTOPERATIVE DIAGNOSIS:  Right medial meniscus tear.    PROCEDURES:  Right knee arthroscopy with partial medial meniscectomy.    SURGEON:  Jose Florentino M.D.    ASSISTANT:  Helena Haney.    COMPLICATIONS:  None.    IMPLANTS:  None.    BLOOD LOSS:  Less than 10 mL.    PROCEDURE IN DETAIL:  After proper consents were obtained, the patient was taken   to the Operating Room and administered general anesthesia.  Right lower   extremity was then prepped and draped in normal sterile fashion.  Incision was   made of the medial and lateral aspects of the patellar tendon and diagnostic   arthroscopy was performed.  There was no pathology in the suprapatellar pouch,   medial or lateral gutter.  Articular cartilage of the patella and trochlea were   intact.  The scope was introduced into the medial compartment and a complex tear   of posterior horn of the medial meniscus was noted with a large radial cleavage   tear component.  Articular cartilage of both the femur and tibia were intact.    A series of instruments were then introduced to remove the unstable portion of   the meniscus and the remaining tissue was debrided to a stable rim with the   arthroscopic shaver.  Remainder of the joint was then inspected after the   meniscus was probed and noted to be stable.  Intercondylar notch was without   pathology.  Lateral compartment was pristine to both visualization and palpation   with the probe.  All arthroscopic instrumentation was then removed from the   joint.  Portals were closed with 4-0 nylon.  A 10 mL Duramorph were infiltrated   into the knee.  Sterile dressings were applied.  The patient was aroused in   Operating Room, transferred to Recovery in stable condition.      LORETTA  dd: 05/09/2017 09:32:13 (CDT)  td: 05/09/2017 13:40:47 (CDT)  Doc ID   #[id number]  Job ID #[id number]    CC:

## 2017-05-09 NOTE — TRANSFER OF CARE
"Anesthesia Transfer of Care Note    Patient: Milad Blanco    Procedure(s) Performed: Procedure(s) (LRB):  ARTHROSCOPY-KNEE-PARTIAL MEDIAL MENISECTOMY (Right)    Patient location: PACU    Anesthesia Type: general    Transport from OR: Transported from OR on room air with adequate spontaneous ventilation    Post pain: adequate analgesia    Post assessment: no apparent anesthetic complications and tolerated procedure well    Post vital signs: stable    Level of consciousness: awake, alert and responds to stimulation    Nausea/Vomiting: no nausea/vomiting    Complications: none    Transfer of care protocol was followed      Last vitals:   Visit Vitals    /89 (BP Location: Right arm)    Pulse 97    Temp 36.8 °C (98.2 °F) (Oral)    Resp 20    Ht 5' 8" (1.727 m)    Wt 92.2 kg (203 lb 4.2 oz)    SpO2 96%    BMI 30.91 kg/m2     "

## 2017-05-09 NOTE — DISCHARGE INSTRUCTIONS
ACTIVITY LEVEL: If you have received sedation or an anesthetic, you may feel sleepy for several hours. Rest until you are more awake. Gradually resume your normal activities.    Dressing: Remove dressing in 48 hours.. Apply ice to knee.               DIET: You may resume your home diet. If nausea is present, increase your diet gradually with fluids and bland foods.    Medications: Pain medication should be taken only if needed and as directed. If antibiotics are prescribed, the medication should be taken until completed. You will be given an updated list of you medications.    ? No driving, alcoholic beverages or signing legal documents for next 24 hours or while taking pain medication.  Last pain med given at 1150 am.    Fall Prevention  Millions of people fall every year and injure themselves. You may have had anesthesia or sedation which may increase your risk of falling. You may have health issues that put you at an increased risk of falling.     Here are ways to reduce your risk of falling.  ·   · Make your home safe by keeping walkways clear of objects you may trip over.  · Use non-slip pads under rugs. Do not use area rugs or small throw rugs.  · Use non-slip mats in bathtubs and showers.  · Install handrails and lights on staircases.  · Do not walk in poorly lit areas.  · Do not stand on chairs or wobbly ladders.  · Use caution when reaching overhead or looking upward. This position can cause a loss of balance.  · Be sure your shoes fit properly, have non-slip bottoms and are in good condition.   · Wear shoes both inside and out. Avoid going barefoot or wearing slippers.  · Be cautious when going up and down stairs, curbs, and when walking on uneven sidewalks.  · If your balance is poor, consider using a cane or walker.  · If your fall was related to alcohol use, stop or limit alcohol intake.   · If your fall was related to use of sleeping medicines, talk to your doctor about this. You may need to reduce  your dosage at bedtime if you awaken during the night to go to the bathroom.    · To reduce the need for nighttime bathroom trips:  ¨ Avoid drinking fluids for several hours before going to bed  ¨ Empty your bladder before going to bed  ¨ Men can keep a urinal at the bedside  · Stay as active as you can. Balance, flexibility, strength, and endurance all come from exercise. They all play a role in preventing falls. Ask your healthcare provider which types of activity are right for you.  · Get your vision checked on a regular basis.  · If you have pets, know where they are before you stand up or walk so you don't trip over them.  · Use night lights.      CALL THE DOCTOR:    For any obvious bleeding (some dried blood over the incision is normal).      Redness, swelling, foul smell around incision or fever over 101.   Shortness of breath, Coughing up Bloody Sputum or Pains or Swelling in your Calves.   Persistent pain or nausea not relieved by medication.   Impaired circulation such as tingling, change in color, numbness or tingling, coldness.    If any unusual problems or difficulties occur contact your doctor. If you cannot contact your doctor but feel your signs and symptoms warrant a physicians attention return to the emergency room.

## 2017-05-09 NOTE — ANESTHESIA PREPROCEDURE EVALUATION
"                                                                                                             05/09/2017  Milad Blanco is a 61 y.o., male.    Anesthesia Evaluation         Review of Systems  Anesthesia Hx:  No problems with previous Anesthesia  History of prior surgery of interest to airway management or planning: Denies Family Hx of Anesthesia complications.   Denies Personal Hx of Anesthesia complications.   Social:  Former Smoker, Alcohol Use    Cardiovascular:   Hypertension    Pulmonary:   COPD Sleep Apnea    Renal/:   Chronic Renal Disease, CRI    Neurological:   CVA, residual symptoms    Psych:   anxiety          Physical Exam  General:  Well nourished    Airway/Jaw/Neck:  Airway Findings: Mouth Opening: Normal General Airway Assessment: Adult  Mallampati: II  TM Distance: Normal, at least 6 cm         Dental:  DENTAL FINDINGS: Normal   Chest/Lungs:  Chest/Lungs Clear    Heart/Vascular:  Heart Findings: Normal Heart murmur: negative       Mental Status:  Mental Status Findings:  Cooperative, Alert and Oriented         Anesthesia Plan  Type of Anesthesia, risks & benefits discussed:  Anesthesia Type:  general  Patient's Preference:   Intra-op Monitoring Plan:   Intra-op Monitoring Plan Comments:   Post Op Pain Control Plan:   Post Op Pain Control Plan Comments:   Induction:   IV  Beta Blocker:  Patient is not currently on a Beta-Blocker (No further documentation required).       Informed Consent: Patient understands risks and agrees with Anesthesia plan.  Questions answered. Anesthesia consent signed with patient.  ASA Score: 3     Day of Surgery Review of History & Physical:            Ready For Surgery From Anesthesia Perspective.     Ht 5' 8" (1.727 m)  Wt 92.2 kg (203 lb 4.2 oz)  BMI 30.91 kg/m2  Wt Readings from Last 3 Encounters:   05/08/17 92.2 kg (203 lb 4.2 oz)   05/05/17 92.2 kg (203 lb 4.2 oz)   03/10/17 87.3 kg (192 lb 7.4 oz)     BP Readings from Last 3 Encounters: "   05/05/17 112/79   03/10/17 114/80   07/12/16 128/80     Review of patient's allergies indicates:   Allergen Reactions    Pcn [penicillins]      Active Ambulatory Problems     Diagnosis Date Noted    Back pain 02/05/2013    Insomnia 02/05/2013    Fatigue 02/05/2013    Anxiety 02/05/2013    PVD (peripheral vascular disease) 02/05/2013    COPD (chronic obstructive pulmonary disease) 02/05/2013    Sleep apnea 02/05/2013    History of CVA (cerebrovascular accident) 02/05/2013    HTN (hypertension) 11/14/2013    Hyperlipidemia 11/14/2013    Weakness 12/30/2015    Stroke     Impaired balance as late effect of cerebrovascular accident (CVA) 01/22/2016    Abnormality of gait following cerebrovascular accident (CVA) 01/22/2016    Neuropathy 02/28/2016    CKD (chronic kidney disease) stage 3, GFR 30-59 ml/min 02/28/2016    Chronic back pain 02/28/2016    Dizziness 06/27/2016    Chronic pain 03/10/2017     Resolved Ambulatory Problems     Diagnosis Date Noted    Tobacco use 02/05/2013    Other specified transient cerebral ischemias 08/29/2013    Dehydration, moderate 08/29/2013    Acute left-sided weakness 08/29/2013    Vomiting     Hypotension 12/31/2015    Acute renal failure 06/27/2016     Past Medical History:   Diagnosis Date    Anxiety 2/5/2013    Back pain 2/5/2013    Chronic back pain 2/28/2016    CKD (chronic kidney disease) stage 3, GFR 30-59 ml/min 2/28/2016    COPD (chronic obstructive pulmonary disease) 2/5/2013    Fatigue 2/5/2013    History of CVA (cerebrovascular accident) 2/5/2013    HTN (hypertension) 11/14/2013    Hyperlipidemia     Hypertension     Insomnia 2/5/2013    Neuropathy 2/28/2016    PVD (peripheral vascular disease) 2/5/2013    Sleep apnea 2/5/2013    Tobacco use 2/5/2013     Past Surgical History:   Procedure Laterality Date    gall stones      loop recorder  04/2016     No current facility-administered medications for this encounter.    Lab Results    Component Value Date    WBC 13.45 (H) 05/05/2017    HGB 14.3 05/05/2017    HCT 43.3 05/05/2017    MCV 92 05/05/2017     (H) 05/05/2017     Lab Results   Component Value Date    INR 1.0 06/27/2016    INR 1.0 12/30/2015    INR 1.0 08/30/2013         Chemistry        Component Value Date/Time     05/05/2017 1620    K 4.0 05/05/2017 1620     05/05/2017 1620    CO2 27 05/05/2017 1620    BUN 14 05/05/2017 1620    CREATININE 0.9 05/05/2017 1620    GLU 74 05/05/2017 1620        Component Value Date/Time    CALCIUM 8.8 05/05/2017 1620    ALKPHOS 82 09/28/2016 1444    AST 47 (H) 09/28/2016 1444    ALT 37 09/28/2016 1444    BILITOT 0.5 09/28/2016 1444

## 2017-05-09 NOTE — ANESTHESIA POSTPROCEDURE EVALUATION
"Anesthesia Post Evaluation    Patient: Milad Blanco    Procedure(s) Performed: Procedure(s) (LRB):  ARTHROSCOPY-KNEE-PARTIAL MEDIAL MENISECTOMY (Right)    Final Anesthesia Type: general  Patient location during evaluation: PACU  Patient participation: Yes- Able to Participate  Level of consciousness: awake and alert  Post-procedure vital signs: reviewed and stable  Pain management: adequate  Airway patency: patent  PONV status at discharge: No PONV  Anesthetic complications: no      Cardiovascular status: blood pressure returned to baseline  Respiratory status: unassisted  Hydration status: euvolemic  Follow-up not needed.        Visit Vitals    /89 (BP Location: Right arm)    Pulse 97    Temp 36.8 °C (98.2 °F) (Oral)    Resp 20    Ht 5' 8" (1.727 m)    Wt 92.2 kg (203 lb 4.2 oz)    SpO2 96%    BMI 30.91 kg/m2       Pain/Amanuel Score: Pain Assessment Performed: Yes (5/9/2017  7:00 AM)  Presence of Pain: complains of pain/discomfort (5/9/2017  7:00 AM)  Modified Amanuel Score: 19 (5/9/2017  7:00 AM)      "

## 2017-05-09 NOTE — IP AVS SNAPSHOT
[place] [address] [place] [address]  Phone: [phone]           Patient Discharge Instructions   Our goal is to set you up for success. This packet includes information on your condition, medications, and your home care.  It will help you care for yourself to prevent having to return to the hospital.     Please ask your nurse if you have any questions.      There are many details to remember when preparing to leave the hospital. Here is what you will need to do:    1. Take your medicine. If you are prescribed medications, review your Medication List on the following pages. You may have new medications to  at the pharmacy and others that you'll need to stop taking. Review the instructions for how and when to take your medications. Talk with your doctor or nurses if you are unsure of what to do.     2. Go to your follow-up appointments. Specific follow-up information is listed in the following pages. Your may be contacted by a nurse or clinical provider about future appointments. Be sure we have all of the phone numbers to reach you. Please contact your provider's office if you are unable to make an appointment.     3. Watch for warning signs. Your doctor or nurse will give you detailed warning signs to watch for and when to call for assistance. These instructions may also include educational information about your condition. If you experience any of warning signs to your health, call your doctor.               ** Verify the list of medication(s) below is accurate and up to date. Carry this with you in case of emergency. If your medications have changed, please notify your healthcare provider.             Medication List      CHANGE how you take these medications        Additional Info                      hydrocodone-acetaminophen 7.5-325mg 7.5-325 mg per tablet   Commonly known as:  NORCO   Quantity:  60 tablet   Refills:  0   Dose:  1 tablet   What changed:  when to  take this    Last time this was given:  1 tablet on 5/9/2017 11:48 AM   Instructions:  Take 1 tablet by mouth every 4 (four) hours as needed for Pain.     Begin Date    AM    Noon    PM    Bedtime         CONTINUE taking these medications        Additional Info                      albuterol 90 mcg/actuation inhaler   Commonly known as:  VENTOLIN HFA   Quantity:  18 g   Refills:  4    Instructions:  INHALE TWO PUFFS BY MOUTH THREE TIMES A DAY AS NEEDED     Begin Date    AM    Noon    PM    Bedtime       aspirin 325 MG tablet   Refills:  0   Dose:  325 mg    Instructions:  Take 1 tablet (325 mg total) by mouth once daily.     Begin Date    AM    Noon    PM    Bedtime       atorvastatin 40 MG tablet   Commonly known as:  LIPITOR   Quantity:  90 tablet   Refills:  3   Dose:  40 mg    Instructions:  Take 1 tablet (40 mg total) by mouth once daily.     Begin Date    AM    Noon    PM    Bedtime       baclofen 20 MG tablet   Commonly known as:  LIORESAL   Refills:  0   Dose:  20 mg    Instructions:  Take 20 mg by mouth 3 (three) times daily.     Begin Date    AM    Noon    PM    Bedtime       budesonide-formoterol 80-4.5 mcg 80-4.5 mcg/actuation Hfaa   Commonly known as:  SYMBICORT   Quantity:  1 Inhaler   Refills:  5   Dose:  2 puff    Instructions:  Inhale 2 puffs into the lungs 2 (two) times daily. Controller     Begin Date    AM    Noon    PM    Bedtime       clopidogrel 75 mg tablet   Commonly known as:  PLAVIX   Quantity:  90 tablet   Refills:  3   Dose:  75 mg    Instructions:  Take 1 tablet (75 mg total) by mouth once daily.     Begin Date    AM    Noon    PM    Bedtime       doxepin 25 MG capsule   Commonly known as:  SINEQUAN   Refills:  0   Dose:  25 mg    Instructions:  Take 25 mg by mouth every evening.     Begin Date    AM    Noon    PM    Bedtime       nitroGLYCERIN 0.4 MG SL tablet   Commonly known as:  NITROSTAT   Refills:  0   Dose:  0.4 mg    Instructions:  Place 0.4 mg under the tongue every 5 (five)  minutes as needed for Chest pain.     Begin Date    AM    Noon    PM    Bedtime       ONE DAILY MULTIVITAMIN per tablet   Refills:  0   Dose:  1 tablet   Generic drug:  multivitamin    Instructions:  Take 1 tablet by mouth once daily.     Begin Date    AM    Noon    PM    Bedtime       pregabalin 75 MG capsule   Commonly known as:  LYRICA   Refills:  0   Dose:  75 mg    Instructions:  Take 75 mg by mouth 2 (two) times daily.     Begin Date    AM    Noon    PM    Bedtime       quetiapine 25 MG Tab   Commonly known as:  SEROQUEL   Refills:  0   Dose:  25 mg    Instructions:  Take 25 mg by mouth once daily.     Begin Date    AM    Noon    PM    Bedtime            Where to Get Your Medications      You can get these medications from any pharmacy     Bring a paper prescription for each of these medications     hydrocodone-acetaminophen 7.5-325mg 7.5-325 mg per tablet                  Please bring to all follow up appointments:    1. A copy of your discharge instructions.  2. All medicines you are currently taking in their original bottles.  3. Identification and insurance card.    Please arrive 15 minutes ahead of scheduled appointment time.    Please call 24 hours in advance if you must reschedule your appointment and/or time.        Your Scheduled Appointments     Jun 12, 2017  8:40 AM CDT   Established Patient Visit with Kirk Nair MD   [facility] ([facility])    [address]  [place] [address] [phone]              Follow-up Information     Follow up with Jose Florentino MD In 2 weeks.    Specialty:  Orthopedic Surgery    Contact information:    [address] [facility] [address]  [facility] [address] [phone]          Discharge Instructions     Future Orders    Activity as tolerated     Call MD for:  persistent nausea and vomiting or diarrhea     Call MD for:  redness, tenderness, or signs of infection (pain, swelling, redness, odor or green/yellow discharge around  incision site)     Call MD for:  severe uncontrolled pain     Call MD for:  temperature >100.4     Diet general     Questions:    Total calories:      Fat restriction, if any:      Protein restriction, if any:      Na restriction, if any:      Fluid restriction:      Additional restrictions:      Shower on day dressing removed (No bath)         Discharge Instructions         ACTIVITY LEVEL: If you have received sedation or an anesthetic, you may feel sleepy for several hours. Rest until you are more awake. Gradually resume your normal activities.    Dressing: Remove dressing in 48 hours.. Apply ice to knee.               DIET: You may resume your home diet. If nausea is present, increase your diet gradually with fluids and bland foods.    Medications: Pain medication should be taken only if needed and as directed. If antibiotics are prescribed, the medication should be taken until completed. You will be given an updated list of you medications.    ? No driving, alcoholic beverages or signing legal documents for next 24 hours or while taking pain medication.  Last pain med given at 1150 am.    Fall Prevention  Millions of people fall every year and injure themselves. You may have had anesthesia or sedation which may increase your risk of falling. You may have health issues that put you at an increased risk of falling.     Here are ways to reduce your risk of falling.  ·   · Make your home safe by keeping walkways clear of objects you may trip over.  · Use non-slip pads under rugs. Do not use area rugs or small throw rugs.  · Use non-slip mats in bathtubs and showers.  · Install handrails and lights on staircases.  · Do not walk in poorly lit areas.  · Do not stand on chairs or wobbly ladders.  · Use caution when reaching overhead or looking upward. This position can cause a loss of balance.  · Be sure your shoes fit properly, have non-slip bottoms and are in good condition.   · Wear shoes both inside and out. Avoid  going barefoot or wearing slippers.  · Be cautious when going up and down stairs, curbs, and when walking on uneven sidewalks.  · If your balance is poor, consider using a cane or walker.  · If your fall was related to alcohol use, stop or limit alcohol intake.   · If your fall was related to use of sleeping medicines, talk to your doctor about this. You may need to reduce your dosage at bedtime if you awaken during the night to go to the bathroom.    · To reduce the need for nighttime bathroom trips:  ¨ Avoid drinking fluids for several hours before going to bed  ¨ Empty your bladder before going to bed  ¨ Men can keep a urinal at the bedside  · Stay as active as you can. Balance, flexibility, strength, and endurance all come from exercise. They all play a role in preventing falls. Ask your healthcare provider which types of activity are right for you.  · Get your vision checked on a regular basis.  · If you have pets, know where they are before you stand up or walk so you don't trip over them.  · Use night lights.      CALL THE DOCTOR:    For any obvious bleeding (some dried blood over the incision is normal).      Redness, swelling, foul smell around incision or fever over 101.   Shortness of breath, Coughing up Bloody Sputum or Pains or Swelling in your Calves.   Persistent pain or nausea not relieved by medication.   Impaired circulation such as tingling, change in color, numbness or tingling, coldness.    If any unusual problems or difficulties occur contact your doctor. If you cannot contact your doctor but feel your signs and symptoms warrant a physicians attention return to the emergency room.        Primary Diagnosis     Your primary diagnosis was:  Tear Of Medial Meniscus Of Knee      Admission Information     Date & Time Provider Department CSN    5/9/2017  6:16 AM Jose Florentino MD [facility] [id number]      Care Providers     Provider Role Specialty Primary office phone     "Jose Florentino MD Attending Provider Orthopedic Surgery [phone]    Jose Florentino MD Surgeon  Orthopedic Surgery [phone]      Your Vitals Were     BP Pulse Temp Resp Height Weight    156/97 86 97.1 °F (36.2 °C) (Tympanic) 15 5' 8" (1.727 m) 92.2 kg (203 lb 4.2 oz)    SpO2 BMI             95% 30.91 kg/m2         Recent Lab Values        8/24/2011 8/30/2013 12/30/2015                     5:10 AM  6:05 AM  3:17 PM         A1C 5.5 6.1 5.5                   Allergies as of 5/9/2017        Reactions    Pcn [Penicillins]       [facility]     [facility] Nurse Care Line - 24/7 Assistance  Unless otherwise directed by your provider, please contact [facility], our nurse care line that is available for 24/7 assistance.     Registered nurses in the [facility] provide clinical advisement, health education, appointment booking, and other advisory services.  Call for this free service at [phone].        Advance Directives     An advance directive is a document which, in the event you are no longer able to make decisions for yourself, tells your healthcare team what kind of treatment you do or do not want to receive, or who you would like to make those decisions for you.  If you do not currently have an advance directive, [facility] encourages you to create one.  For more information call:  [phone]), [phone] ([phone]),  or log on to [email or web address].        Smoking Cessation     If you would like to quit smoking:   You may be eligible for free services if you are a [place] resident and started smoking cigarettes before September 1, 1988.  Call the Smoking Cessation Trust (SCT) toll free at (585) 975-4708 or (657) 651-0137.   Call 3-122-QUIT-NOW if you do not meet the above criteria.   Contact us via email: [email or web address]   View our website for more information: [email or web address]        Language Assistance Services     " ATTENTION: Language assistance services are available, free of charge. Please call [phone].      ATENCIÓN: Si linda strong, tiene a jade disposición servicios gratuitos de asistencia lingüística. Llame al [phone].     CHÚ Ý: N?u b?n nói Ti?ng Vi?t, có các d?ch v? h? tr? ngôn ng? mi?n phí dành cho b?n. G?i s? [phone].        Stroke Education              Chronic Kindey Disease Education             [facility] Sign-Up     Activating your [facility] account is as easy as 1-2-3!     1) Visit [email or web address], select Sign Up Now, enter this activation code and your date of birth, then select Next.  [email or web address]  Expires: 6/19/2017  4:15 PM      2) Create a username and password to use when you visit [facility] in the future and select a security question in case you lose your password and select Next.    3) Enter your e-mail address and click Sign Up!    Additional Information  If you have questions, please e-mail [email or web address] or call [phone] to talk to our [facility] staff. Remember, [facility] is NOT to be used for urgent needs. For medical emergencies, dial 911.          [facility] complies with applicable Federal civil rights laws and does not discriminate on the basis of race, color, national origin, age, disability, or sex.

## 2017-05-15 NOTE — DISCHARGE SUMMARY
[facility]  Orthopedics  Discharge Summary      Patient Name: Milad Blanco  MRN: [id number]  Admission Date: 5/9/2017  Hospital Length of Stay: 0 days  Discharge Date and Time: 5/9/2017 12:30 PM  Attending Physician: No att. providers found Dr. Jose Florentino  Discharging Provider: DENISSE Wilhelm  Primary Care Provider: Kirk Nair MD    HPI: right knee pain    Procedure(s) (LRB):  ARTHROSCOPY-KNEE-PARTIAL MEDIAL MENISECTOMY (Right)  ARTHROSCOPY-MENISCECTOMY (Right)      Hospital Course: Patient was admitted to orthopedic surgery service with a pre op diagnosis of right knee pain. He had a right knee arthroscopy with partial medial meniscectomy. He tolerated the procedure well with no complications and no consults were made post op. He was discharged home to self care and is to follow up in two weeks.         Significant Diagnostic Studies: Labs: stable upon discharge    Pending Diagnostic Studies:     None        Final Active Diagnoses:    Diagnosis Date Noted POA    PRINCIPAL PROBLEM:  Acute medial meniscus tear [S83.249A] 05/09/2017 Yes      Problems Resolved During this Admission:    Diagnosis Date Noted Date Resolved POA      Discharged Condition: stable    Disposition: Home or Self Care    Follow Up:  Follow-up Information     Follow up with Jose Florentino MD In 2 weeks.    Specialty:  Orthopedic Surgery    Contact information:    [facility][note 3][facility] [id number] [phone]          Patient Instructions: WBAT. Normal diet. Follow up in two weeks.     Diet general     Call MD for:  temperature >100.4     Call MD for:  persistent nausea and vomiting or diarrhea     Call MD for:  severe uncontrolled pain     Call MD for:  redness, tenderness, or signs of infection (pain, swelling, redness, odor or green/yellow discharge around incision site)     Remove dressing in 48 hours     Activity as tolerated     Shower on day dressing removed (No bath)     Ice to  affected area       Medications:  Reconciled Home Medications:   Discharge Medication List as of 5/9/2017 12:19 PM      CONTINUE these medications which have CHANGED    Details   hydrocodone-acetaminophen 7.5-325mg (NORCO) 7.5-325 mg per tablet Take 1 tablet by mouth every 4 (four) hours as needed for Pain., Starting 5/9/2017, Until Discontinued, Print         CONTINUE these medications which have NOT CHANGED    Details   albuterol (VENTOLIN HFA) 90 mcg/actuation inhaler INHALE TWO PUFFS BY MOUTH THREE TIMES A DAY AS NEEDED, Normal      aspirin 325 MG tablet Take 1 tablet (325 mg total) by mouth once daily., Starting 12/31/2015, Until Fri 5/5/17, OTC      atorvastatin (LIPITOR) 40 MG tablet Take 1 tablet (40 mg total) by mouth once daily., Starting 3/10/2017, Until Sat 3/10/18, Normal      baclofen (LIORESAL) 20 MG tablet Take 20 mg by mouth 3 (three) times daily., Until Discontinued, Historical Med      budesonide-formoterol 80-4.5 mcg (SYMBICORT) 80-4.5 mcg/actuation HFAA Inhale 2 puffs into the lungs 2 (two) times daily. Controller, Starting 3/10/2017, Until Sat 3/10/18, Normal      clopidogrel (PLAVIX) 75 mg tablet Take 1 tablet (75 mg total) by mouth once daily., Starting 3/17/2017, Until Sat 3/17/18, Normal      doxepin (SINEQUAN) 25 MG capsule Take 25 mg by mouth every evening., Until Discontinued, Historical Med      multivitamin (ONE DAILY MULTIVITAMIN) per tablet Take 1 tablet by mouth once daily., Until Discontinued, Historical Med      nitroGLYCERIN (NITROSTAT) 0.4 MG SL tablet Place 0.4 mg under the tongue every 5 (five) minutes as needed for Chest pain., Until Discontinued, Historical Med      pregabalin (LYRICA) 75 MG capsule Take 75 mg by mouth 2 (two) times daily., Until Discontinued, Historical Med      quetiapine (SEROQUEL) 25 MG Tab Take 25 mg by mouth once daily., Until Discontinued, Historical Med             DENISSE Wilhelm  Orthopedics  [facility]

## 2017-05-22 ENCOUNTER — CLINICAL SUPPORT (OUTPATIENT)
Dept: ELECTROPHYSIOLOGY | Facility: CLINIC | Age: 62
End: 2017-05-22
Payer: MEDICARE

## 2017-05-22 DIAGNOSIS — I63.9 CRYPTOGENIC STROKE: ICD-10-CM

## 2017-05-22 DIAGNOSIS — Z95.818 STATUS POST PLACEMENT OF IMPLANTABLE LOOP RECORDER: ICD-10-CM

## 2017-05-22 PROCEDURE — 93299 LOOP RECORDER REMOTE: CPT | Mod: S$GLB,,, | Performed by: INTERNAL MEDICINE

## 2017-05-22 PROCEDURE — 93297 REM INTERROG DEV EVAL ICPMS: CPT | Mod: S$GLB,,, | Performed by: INTERNAL MEDICINE

## 2017-05-30 ENCOUNTER — PATIENT OUTREACH (OUTPATIENT)
Dept: ADMINISTRATIVE | Facility: HOSPITAL | Age: 62
End: 2017-05-30

## 2017-05-31 RX ORDER — ATORVASTATIN CALCIUM 40 MG/1
40 TABLET, FILM COATED ORAL DAILY
Qty: 90 TABLET | Refills: 3 | Status: SHIPPED | OUTPATIENT
Start: 2017-05-31 | End: 2018-08-30

## 2017-05-31 RX ORDER — DOXEPIN HYDROCHLORIDE 25 MG/1
25 CAPSULE ORAL NIGHTLY
Status: CANCELLED | OUTPATIENT
Start: 2017-05-31

## 2017-05-31 RX ORDER — CLOPIDOGREL BISULFATE 75 MG/1
75 TABLET ORAL DAILY
Qty: 90 TABLET | Refills: 3 | Status: SHIPPED | OUTPATIENT
Start: 2017-05-31 | End: 2018-08-30

## 2017-05-31 RX ORDER — QUETIAPINE FUMARATE 25 MG/1
25 TABLET, FILM COATED ORAL DAILY
Status: CANCELLED | OUTPATIENT
Start: 2017-05-31

## 2017-05-31 NOTE — TELEPHONE ENCOUNTER
----- Message from Angela Gilliland sent at 5/31/2017 11:10 AM CDT -----  Contact: pt   X  1st Request  _  2nd Request  _  3rd Request    Who:JARROD LEAL [[id number]]    Why: Patient states she would like to get a refill on his medications doxepin (SINEQUAN) 25 MG capsule, quetiapine (SEROQUEL) 25 MG Tab, atorvastatin (LIPITOR) 40 MG tablet and clopidogrel (PLAVIX) 75 mg tablet called into [id number] - [place], [place] - [address]    What Number to Call Back: [phone]    When to Expect a call back: (Before the end of the day)   -- if call after 3:00 call back will be tomorrow.

## 2017-06-26 ENCOUNTER — TELEPHONE (OUTPATIENT)
Dept: ELECTROPHYSIOLOGY | Facility: CLINIC | Age: 62
End: 2017-06-26

## 2017-06-26 DIAGNOSIS — I63.9 CEREBROVASCULAR ACCIDENT (CVA), UNSPECIFIED MECHANISM: Primary | ICD-10-CM

## 2017-06-29 ENCOUNTER — TELEPHONE (OUTPATIENT)
Dept: ELECTROPHYSIOLOGY | Facility: CLINIC | Age: 62
End: 2017-06-29

## 2017-07-03 ENCOUNTER — TELEPHONE (OUTPATIENT)
Dept: ELECTROPHYSIOLOGY | Facility: CLINIC | Age: 62
End: 2017-07-03

## 2017-07-03 NOTE — TELEPHONE ENCOUNTER
Called pt to see if he is still coming for his appts today.  Phone rings, then goes bzy.  Unable to leave a message.

## 2017-07-10 DIAGNOSIS — Z12.11 COLON CANCER SCREENING: ICD-10-CM

## 2017-07-18 ENCOUNTER — CLINICAL SUPPORT (OUTPATIENT)
Dept: ELECTROPHYSIOLOGY | Facility: CLINIC | Age: 62
End: 2017-07-18
Payer: MEDICARE

## 2017-07-18 DIAGNOSIS — I63.9 CRYPTOGENIC STROKE: ICD-10-CM

## 2017-07-18 DIAGNOSIS — Z95.818 STATUS POST PLACEMENT OF IMPLANTABLE LOOP RECORDER: ICD-10-CM

## 2017-08-16 PROCEDURE — 93297 REM INTERROG DEV EVAL ICPMS: CPT | Mod: S$GLB,,, | Performed by: INTERNAL MEDICINE

## 2017-08-16 PROCEDURE — 93299 LOOP RECORDER REMOTE: CPT | Mod: S$GLB,,, | Performed by: INTERNAL MEDICINE

## 2017-08-25 DIAGNOSIS — Z12.11 COLON CANCER SCREENING: ICD-10-CM

## 2017-09-18 ENCOUNTER — CLINICAL SUPPORT (OUTPATIENT)
Dept: ELECTROPHYSIOLOGY | Facility: CLINIC | Age: 62
End: 2017-09-18
Payer: MEDICARE

## 2017-09-18 DIAGNOSIS — Z95.818 STATUS POST PLACEMENT OF IMPLANTABLE LOOP RECORDER: ICD-10-CM

## 2017-09-18 DIAGNOSIS — I63.9 CRYPTOGENIC STROKE: ICD-10-CM

## 2017-09-18 PROCEDURE — 93299 LOOP RECORDER REMOTE: CPT | Mod: S$GLB,,, | Performed by: INTERNAL MEDICINE

## 2017-09-18 PROCEDURE — 93297 REM INTERROG DEV EVAL ICPMS: CPT | Mod: S$GLB,,, | Performed by: INTERNAL MEDICINE

## 2017-10-18 ENCOUNTER — CLINICAL SUPPORT (OUTPATIENT)
Dept: ELECTROPHYSIOLOGY | Facility: CLINIC | Age: 62
End: 2017-10-18
Attending: INTERNAL MEDICINE
Payer: MEDICARE

## 2017-10-18 DIAGNOSIS — I63.9 CRYPTOGENIC STROKE: ICD-10-CM

## 2017-10-18 DIAGNOSIS — Z95.818 STATUS POST PLACEMENT OF IMPLANTABLE LOOP RECORDER: ICD-10-CM

## 2017-10-18 PROCEDURE — 93298 REM INTERROG DEV EVAL SCRMS: CPT | Mod: S$GLB,,, | Performed by: INTERNAL MEDICINE

## 2017-10-18 PROCEDURE — 93299 LOOP RECORDER REMOTE: CPT | Mod: S$GLB,,, | Performed by: INTERNAL MEDICINE

## 2017-11-20 ENCOUNTER — CLINICAL SUPPORT (OUTPATIENT)
Dept: ELECTROPHYSIOLOGY | Facility: CLINIC | Age: 62
End: 2017-11-20
Attending: INTERNAL MEDICINE
Payer: MEDICARE

## 2017-11-20 DIAGNOSIS — Z95.818 STATUS POST PLACEMENT OF IMPLANTABLE LOOP RECORDER: ICD-10-CM

## 2017-11-20 DIAGNOSIS — I63.9 CRYPTOGENIC STROKE: ICD-10-CM

## 2017-11-20 PROCEDURE — 93298 REM INTERROG DEV EVAL SCRMS: CPT | Mod: S$GLB,,, | Performed by: INTERNAL MEDICINE

## 2017-11-20 PROCEDURE — 93299 LOOP RECORDER REMOTE: CPT | Mod: S$GLB,,, | Performed by: INTERNAL MEDICINE

## 2017-12-21 ENCOUNTER — CLINICAL SUPPORT (OUTPATIENT)
Dept: ELECTROPHYSIOLOGY | Facility: CLINIC | Age: 62
End: 2017-12-21
Attending: INTERNAL MEDICINE
Payer: MEDICARE

## 2017-12-21 DIAGNOSIS — Z95.818 STATUS POST PLACEMENT OF IMPLANTABLE LOOP RECORDER: ICD-10-CM

## 2017-12-21 DIAGNOSIS — I63.9 CRYPTOGENIC STROKE: ICD-10-CM

## 2018-01-22 ENCOUNTER — CLINICAL SUPPORT (OUTPATIENT)
Dept: ELECTROPHYSIOLOGY | Facility: CLINIC | Age: 63
End: 2018-01-22
Attending: INTERNAL MEDICINE
Payer: MEDICARE

## 2018-01-22 DIAGNOSIS — Z95.818 STATUS POST PLACEMENT OF IMPLANTABLE LOOP RECORDER: ICD-10-CM

## 2018-01-22 DIAGNOSIS — I63.9 CRYPTOGENIC STROKE: ICD-10-CM

## 2018-01-22 PROCEDURE — 93298 REM INTERROG DEV EVAL SCRMS: CPT | Mod: ,,, | Performed by: INTERNAL MEDICINE

## 2018-01-22 PROCEDURE — 93299 LOOP RECORDER REMOTE: CPT | Mod: PBBFAC | Performed by: INTERNAL MEDICINE

## 2018-02-22 ENCOUNTER — CLINICAL SUPPORT (OUTPATIENT)
Dept: ELECTROPHYSIOLOGY | Facility: CLINIC | Age: 63
End: 2018-02-22
Attending: INTERNAL MEDICINE
Payer: MEDICARE

## 2018-02-22 DIAGNOSIS — I63.9 CRYPTOGENIC STROKE: ICD-10-CM

## 2018-02-22 DIAGNOSIS — Z95.818 STATUS POST PLACEMENT OF IMPLANTABLE LOOP RECORDER: ICD-10-CM

## 2018-02-22 PROCEDURE — 93298 REM INTERROG DEV EVAL SCRMS: CPT | Mod: ,,, | Performed by: INTERNAL MEDICINE

## 2018-02-22 PROCEDURE — 93299 LOOP RECORDER REMOTE: CPT | Mod: PBBFAC | Performed by: INTERNAL MEDICINE

## 2018-04-23 ENCOUNTER — CLINICAL SUPPORT (OUTPATIENT)
Dept: ELECTROPHYSIOLOGY | Facility: CLINIC | Age: 63
End: 2018-04-23
Attending: INTERNAL MEDICINE
Payer: MEDICARE

## 2018-04-23 DIAGNOSIS — I63.9 CRYPTOGENIC STROKE: ICD-10-CM

## 2018-04-23 DIAGNOSIS — Z95.818 STATUS POST PLACEMENT OF IMPLANTABLE LOOP RECORDER: ICD-10-CM

## 2018-04-23 PROCEDURE — 93299 LOOP RECORDER REMOTE: CPT | Mod: PBBFAC | Performed by: INTERNAL MEDICINE

## 2018-04-23 PROCEDURE — 93298 REM INTERROG DEV EVAL SCRMS: CPT | Mod: ,,, | Performed by: INTERNAL MEDICINE

## 2018-05-22 DIAGNOSIS — I63.9 CRYPTOGENIC STROKE: ICD-10-CM

## 2018-05-22 DIAGNOSIS — Z95.818 STATUS POST PLACEMENT OF IMPLANTABLE LOOP RECORDER: Primary | ICD-10-CM

## 2018-05-23 ENCOUNTER — CLINICAL SUPPORT (OUTPATIENT)
Dept: ELECTROPHYSIOLOGY | Facility: CLINIC | Age: 63
End: 2018-05-23
Attending: INTERNAL MEDICINE
Payer: MEDICARE

## 2018-05-23 DIAGNOSIS — I63.9 CRYPTOGENIC STROKE: ICD-10-CM

## 2018-05-23 DIAGNOSIS — Z95.818 STATUS POST PLACEMENT OF IMPLANTABLE LOOP RECORDER: ICD-10-CM

## 2018-05-23 PROCEDURE — 93298 REM INTERROG DEV EVAL SCRMS: CPT | Mod: ,,, | Performed by: INTERNAL MEDICINE

## 2018-05-23 PROCEDURE — 93299 LOOP RECORDER REMOTE: CPT | Mod: PBBFAC | Performed by: INTERNAL MEDICINE

## 2018-07-24 ENCOUNTER — CLINICAL SUPPORT (OUTPATIENT)
Dept: ELECTROPHYSIOLOGY | Facility: CLINIC | Age: 63
End: 2018-07-24
Attending: INTERNAL MEDICINE
Payer: MEDICARE

## 2018-07-24 DIAGNOSIS — I63.9 CRYPTOGENIC STROKE: ICD-10-CM

## 2018-07-24 DIAGNOSIS — Z95.818 STATUS POST PLACEMENT OF IMPLANTABLE LOOP RECORDER: ICD-10-CM

## 2018-07-24 PROCEDURE — 93299 LOOP RECORDER REMOTE: CPT | Mod: PBBFAC | Performed by: INTERNAL MEDICINE

## 2018-07-24 PROCEDURE — 93298 REM INTERROG DEV EVAL SCRMS: CPT | Mod: ,,, | Performed by: INTERNAL MEDICINE

## 2018-08-23 ENCOUNTER — CLINICAL SUPPORT (OUTPATIENT)
Dept: ELECTROPHYSIOLOGY | Facility: CLINIC | Age: 63
End: 2018-08-23
Attending: INTERNAL MEDICINE
Payer: MEDICARE

## 2018-08-23 DIAGNOSIS — Z95.818 STATUS POST PLACEMENT OF IMPLANTABLE LOOP RECORDER: ICD-10-CM

## 2018-08-23 DIAGNOSIS — I63.9 CRYPTOGENIC STROKE: ICD-10-CM

## 2018-08-30 ENCOUNTER — CLINICAL SUPPORT (OUTPATIENT)
Dept: ELECTROPHYSIOLOGY | Facility: CLINIC | Age: 63
End: 2018-08-30
Attending: INTERNAL MEDICINE
Payer: MEDICARE

## 2018-08-30 DIAGNOSIS — I63.9 CRYPTOGENIC STROKE: ICD-10-CM

## 2018-08-30 DIAGNOSIS — Z95.818 STATUS POST PLACEMENT OF IMPLANTABLE LOOP RECORDER: ICD-10-CM

## 2018-08-30 PROCEDURE — 93298 REM INTERROG DEV EVAL SCRMS: CPT | Mod: S$GLB,,, | Performed by: INTERNAL MEDICINE

## 2018-08-30 PROCEDURE — 93299 LOOP RECORDER REMOTE: CPT | Mod: S$GLB,,, | Performed by: INTERNAL MEDICINE

## 2018-09-21 ENCOUNTER — TELEPHONE (OUTPATIENT)
Dept: ELECTROPHYSIOLOGY | Facility: CLINIC | Age: 63
End: 2018-09-21

## 2018-09-21 NOTE — TELEPHONE ENCOUNTER
Patient loop monitor not connected. Mr Blanco's # is out of order. LM on Ms Blanco's VM. Mr Milad HOWELL statets his mom and dad are in the process of moving. He will give them the message to call me.

## 2018-10-03 ENCOUNTER — CLINICAL SUPPORT (OUTPATIENT)
Dept: ELECTROPHYSIOLOGY | Facility: CLINIC | Age: 63
End: 2018-10-03
Attending: INTERNAL MEDICINE
Payer: MEDICARE

## 2018-10-03 DIAGNOSIS — Z95.818 STATUS POST PLACEMENT OF IMPLANTABLE LOOP RECORDER: ICD-10-CM

## 2018-10-03 DIAGNOSIS — I63.9 CRYPTOGENIC STROKE: ICD-10-CM

## 2018-10-11 ENCOUNTER — OFFICE VISIT (OUTPATIENT)
Dept: UROLOGY | Facility: CLINIC | Age: 63
End: 2018-10-11
Payer: MEDICARE

## 2018-10-11 VITALS
SYSTOLIC BLOOD PRESSURE: 118 MMHG | WEIGHT: 188.94 LBS | HEIGHT: 68 IN | DIASTOLIC BLOOD PRESSURE: 60 MMHG | BODY MASS INDEX: 28.63 KG/M2

## 2018-10-11 DIAGNOSIS — N13.30 HYDRONEPHROSIS, RIGHT: ICD-10-CM

## 2018-10-11 DIAGNOSIS — N20.0 NEPHROLITHIASIS: Primary | ICD-10-CM

## 2018-10-11 DIAGNOSIS — R10.9 RIGHT FLANK PAIN: ICD-10-CM

## 2018-10-11 LAB
BILIRUB SERPL-MCNC: NEGATIVE MG/DL
BLOOD URINE, POC: NEGATIVE
COLOR, POC UA: YELLOW
GLUCOSE UR QL STRIP: NEGATIVE
KETONES UR QL STRIP: NEGATIVE
LEUKOCYTE ESTERASE URINE, POC: NEGATIVE
NITRITE, POC UA: NEGATIVE
PH, POC UA: 5
PROTEIN, POC: NEGATIVE
SPECIFIC GRAVITY, POC UA: 1000
UROBILINOGEN, POC UA: NEGATIVE

## 2018-10-11 PROCEDURE — 99204 OFFICE O/P NEW MOD 45 MIN: CPT | Mod: S$PBB,,, | Performed by: NURSE PRACTITIONER

## 2018-10-11 PROCEDURE — 3008F BODY MASS INDEX DOCD: CPT | Mod: CPTII,,, | Performed by: NURSE PRACTITIONER

## 2018-10-11 PROCEDURE — 81001 URINALYSIS AUTO W/SCOPE: CPT | Mod: PBBFAC | Performed by: NURSE PRACTITIONER

## 2018-10-11 PROCEDURE — 99214 OFFICE O/P EST MOD 30 MIN: CPT | Mod: PBBFAC | Performed by: NURSE PRACTITIONER

## 2018-10-11 PROCEDURE — 99999 PR PBB SHADOW E&M-EST. PATIENT-LVL IV: CPT | Mod: PBBFAC,,, | Performed by: NURSE PRACTITIONER

## 2018-10-11 PROCEDURE — 3078F DIAST BP <80 MM HG: CPT | Mod: CPTII,,, | Performed by: NURSE PRACTITIONER

## 2018-10-11 PROCEDURE — 3074F SYST BP LT 130 MM HG: CPT | Mod: CPTII,,, | Performed by: NURSE PRACTITIONER

## 2018-10-11 RX ORDER — HYDROCODONE BITARTRATE AND ACETAMINOPHEN 7.5; 325 MG/1; MG/1
1 TABLET ORAL EVERY 4 HOURS PRN
Qty: 15 TABLET | Refills: 0 | Status: SHIPPED | OUTPATIENT
Start: 2018-10-11

## 2018-10-11 RX ORDER — TAMSULOSIN HYDROCHLORIDE 0.4 MG/1
0.4 CAPSULE ORAL DAILY
Qty: 7 CAPSULE | Refills: 0 | Status: SHIPPED | OUTPATIENT
Start: 2018-10-11 | End: 2019-10-11

## 2018-10-11 NOTE — LETTER
October 11, 2018      Ahsan Taylor MD  [address]  [address]  [place] [address]           [facility]  [address] [facility] [address]  [place] [address]  Phone: [phone]  Fax: [phone]          Patient: Milad Blanco   MR Number: [id number]   YOB: 1955   Date of Visit: 10/11/2018       Dear Dr. Ahsan Taylor:    Thank you for referring Milad Blanco to me for evaluation. Attached you will find relevant portions of my assessment and plan of care.    If you have questions, please do not hesitate to call me. I look forward to following Milad Blanco along with you.    Sincerely,    Melissa Trammell, JULIETTE    Enclosure  CC:  No Recipients    If you would like to receive this communication electronically, please contact [email or web address] or [phone] to request more information on [employer] access.    For providers and/or their staff who would like to refer a patient to [facility], please contact us through our one-stop-shop provider referral line, [facility] at [phone].    If you feel you have received this communication in error or would no longer like to receive these types of communications, please e-mail [email or web address]

## 2018-10-11 NOTE — PROGRESS NOTES
Subjective:       Patient ID: Milad Blanco is a 62 y.o. male who is a new patient was referred by Ahsan Taylor MD for evaluation of kidney stones    Chief Complaint:   Chief Complaint   Patient presents with    Nephrolithiasis     Patient in severe pain from kidney stones. blood in urine.. burning when urinating       Nephrolithiasis  Patient complains of right flank pain without radiation to the abdomen and testicles. Onset of symptoms was abrupt starting several days ago with unchanged course since that time. Patient describes the pain as colicky, sharp and stabbing, intermittent and rated as moderate. The patient has had no nausea/no vomiting and no diaphoresis. There has been no fever or chills. The patient is not complaining of dysuria or frequency. Risk factors for urolithiasis: history of stone disease, poor fluid intake and tobacco use. He reports ESWL ~40 years ago. He has passed many stones spontaneously    He presented to ED on 10/8/18 with a 5 day history of right flank pain, dysuria and gross hematuria. CT scan showed obstructing right distal ureteral stones x 2 at R UVJ (3-4mm) with moderate hydronephrosis, bilateral non obstructing kidney stones. He was prescribed Flomax and [place] from ED. He reports right flank pain is mildly controlled with current pain medication. He has not noted passage of stones    ACTIVE MEDICAL ISSUES:  Patient Active Problem List   Diagnosis    Back pain    Insomnia    Fatigue    Anxiety    PVD (peripheral vascular disease)    COPD (chronic obstructive pulmonary disease)    Sleep apnea    History of CVA (cerebrovascular accident)    HTN (hypertension)    Hyperlipidemia    Weakness    Stroke    Impaired balance as late effect of cerebrovascular accident (CVA)    Abnormality of gait following cerebrovascular accident (CVA)    Neuropathy    CKD (chronic kidney disease) stage 3, GFR 30-59 ml/min    Chronic back pain    Dizziness    Chronic pain     Acute medial meniscus tear       PAST MEDICAL HISTORY  Past Medical History:   Diagnosis Date    Anxiety 2013    Back pain 2013    Chronic back pain 2016    CKD (chronic kidney disease) stage 3, GFR 30-59 ml/min 2016    COPD (chronic obstructive pulmonary disease) 2013    Fatigue 2013    History of CVA (cerebrovascular accident) 2013    HTN (hypertension) 2013    Hyperlipidemia     Hypertension     Insomnia 2013    Neuropathy 2016    PVD (peripheral vascular disease) 2013    Sleep apnea 2013    Tobacco use 2013       PAST SURGICAL HISTORY:  Past Surgical History:   Procedure Laterality Date    ARTHROSCOPY-KNEE-PARTIAL MEDIAL MENISECTOMY Right 2017    Performed by [facility]    ARTHROSCOPY-MENISCECTOMY Right 2017    Per[facility] at [facility] OR    gall stones      loop recorder  2016       SOCIAL HISTORY:  Social History     Tobacco Use    Smoking status: Former Smoker     Types: Cigars     Last attempt to quit: 2013     Years since quittin.7   Substance Use Topics    Alcohol use: Yes     Alcohol/week: 12.0 oz     Types: 20 Cans of beer per week     Comment: socially    Drug use: No       FAMILY HISTORY:  Family History   Problem Relation Age of Onset    Diabetes Mother     Alcohol abuse Father     Heart disease Brother     Nephrolithiasis Brother        ALLERGIES AND MEDICATIONS: updated and reviewed.  Review of patient's allergies indicates:   Allergen Reactions    Pcn [penicillins]      Current Outpatient Medications   Medication Sig    albuterol (VENTOLIN HFA) 90 mcg/actuation inhaler INHALE TWO PUFFS BY MOUTH THREE TIMES A DAY AS NEEDED    baclofen (LIORESAL) 20 MG tablet Take 20 mg by mouth 3 (three) times daily.    meloxicam (MOBIC) 15 MG tablet Take 1 tablet (15 mg total) by mouth once daily.    multivitamin (ONE DAILY MULTIVITAMIN) per tablet Take 1 tablet  by mouth once daily.    naproxen (NAPROSYN) 500 MG tablet Take 1 tablet (500 mg total) by mouth 2 (two) times daily with meals.    nitroGLYCERIN (NITROSTAT) 0.4 MG SL tablet Place 0.4 mg under the tongue every 5 (five) minutes as needed for Chest pain.    pregabalin (LYRICA) 75 MG capsule Take 75 mg by mouth 2 (two) times daily.    tamsulosin (FLOMAX) 0.4 mg Cap Take 1 capsule (0.4 mg total) by mouth once daily.    aspirin 325 MG tablet Take 1 tablet (325 mg total) by mouth once daily.    atorvastatin (LIPITOR) 40 MG tablet Take 1 tablet (40 mg total) by mouth once daily.    budesonide-formoterol 80-4.5 mcg (SYMBICORT) 80-4.5 mcg/actuation HFAA Inhale 2 puffs into the lungs 2 (two) times daily. Controller    clopidogrel (PLAVIX) 75 mg tablet Take 1 tablet (75 mg total) by mouth once daily.    HYDROcodone-acetaminophen (NORCO) 7.5-325 mg per tablet Take 1 tablet by mouth every 4 (four) hours as needed for Pain.     No current facility-administered medications for this visit.        Review of Systems   Constitutional: Negative for activity change, chills, fatigue, fever and unexpected weight change.   Eyes: Negative for discharge, redness and visual disturbance.   Respiratory: Negative for cough, shortness of breath and wheezing.    Cardiovascular: Negative for chest pain and leg swelling.   Gastrointestinal: Negative for abdominal distention, abdominal pain, constipation, diarrhea, nausea and vomiting.   Genitourinary: Positive for flank pain (right sided). Negative for decreased urine volume, difficulty urinating, discharge, dysuria, frequency, hematuria, penile pain, scrotal swelling, testicular pain and urgency.   Musculoskeletal: Negative for arthralgias, joint swelling and myalgias.   Skin: Negative for color change and rash.   Neurological: Negative for dizziness and light-headedness.   Psychiatric/Behavioral: Negative for behavioral problems and confusion. The patient is not nervous/anxious.       "  Objective:      Vitals:    10/11/18 1345   BP: 118/60   Weight: 85.7 kg (188 lb 15 oz)   Height: 5' 8" (1.727 m)     Physical Exam   Constitutional: He is oriented to person, place, and time. He appears well-developed.   HENT:   Head: Normocephalic and atraumatic.   Nose: Nose normal.   Eyes: Conjunctivae are normal. Right eye exhibits no discharge. Left eye exhibits no discharge.   Neck: Normal range of motion. Neck supple. No tracheal deviation present. No thyromegaly present.   Cardiovascular: Normal rate and regular rhythm.    Pulmonary/Chest: Effort normal. No respiratory distress. He has no wheezes.   Abdominal: Soft. He exhibits no distension. There is no hepatosplenomegaly. There is no tenderness. There is no CVA tenderness. No hernia.   Genitourinary:   Genitourinary Comments: Patient declined exam   Musculoskeletal: Normal range of motion. He exhibits no edema.   Neurological: He is alert and oriented to person, place, and time.   Skin: Skin is warm and dry. No rash noted. No erythema.     Psychiatric: He has a normal mood and affect. His behavior is normal. Judgment normal.       Urine dipstick shows negative for all components.  Micro exam: negative for WBC's or RBC's.    Narrative     EXAMINATION:  CT RENAL STONE STUDY ABD PELVIS WO    CLINICAL HISTORY:  Flank pain, stone disease suspected;    TECHNIQUE:  Low dose axial images, sagittal and coronal reformations were obtained from the lung bases to the pubic symphysis.  Contrast was not administered.    COMPARISON:  12/30/2015.    FINDINGS:  There are linear opacities within both lung bases likely representing linear atelectasis or fibrotic streaks.  The lung bases are otherwise clear.  The bones are intact.  There is no evidence for acute fracture or bone destruction.  The liver is normal in size and is homogeneous in density with no focal abnormalities of the liver identified.  The gallbladder is surgically absent.  The spleen, stomach, and pancreas " appear unremarkable.  The adrenal glands are not enlarged.  There are 2 adjacent distal right ureteral calculi located at the right ureterovesical junction each measuring 3-4 mm in size.  These result in moderate right-sided hydronephrosis and hydroureter with stranding of the perinephric and periureteral fat.  There is a 4 mm nonobstructing calculus within the upper pole of the right kidney and at least 2 additional punctate nonobstructing calculi are identified within the lower pole of the right kidney.  There are at least 6 nonobstructing left renal calculi present with the largest measuring 4 mm in size.  Atherosclerotic calcification is present within the abdominal aorta with mild fusiform aneurysmal dilatation of the infrarenal abdominal aorta measuring up to 3.1 cm in diameter.  No para-aortic lymphadenopathy is identified.  The appendix is present and appears unremarkable.  There are no dilated loops of bowel evident.  The urinary bladder is decompressed.  There are prostatic calcifications identified.  There are bilateral fat containing inguinal hernias.      Impression       Two adjacent distal right ureteral calculi at the right ureterovesical junction each measuring 3-4 mm in size.  These result in moderate hydronephrosis and hydroureter with stranding of the perinephric and periureteral fat.    Multiple additional nonobstructing bilateral renal calculi.    Mild fusiform aneurysmal dilatation of the infrarenal abdominal aorta measuring approximately 3.1 cm in diameter.    Prominent bilateral fat containing inguinal hernias.    S/p cholecystectomy.      Electronically signed by: Loc Russo MD  Date: 10/09/2018  Time: 10:32     Reviewed with patient    Assessment:       1. Nephrolithiasis    2. Right flank pain    3. Hydronephrosis, right          Plan:       1. Nephrolithiasis  -CT scan-- obstructing R UVJ stones x 2 with moderate hydronephrosis, trino non obstructing stones  -We discussed trial of  passage vs procedure for stone removal. Patient elected to undergo trial of passage  -Continue Flomax, increase fluids, strain urine  - POCT urinalysis, dipstick or tablet reag  - US Retroperitoneal Complete (Kidney and; Future    2. Right flank pain  - HYDROcodone-acetaminophen (NORCO) 7.5-325 mg per tablet; Take 1 tablet by mouth every 4 (four) hours as needed for Pain.  Dispense: 15 tablet; Refill: 0  -Discussed red flags with patient to ER with fever, worsening of pain and/or inability to tolerate PO    3. Hydronephrosis, right  -2/2 R UVJ stones x 2  - US Retroperitoneal Complete (Kidney and; Future            Follow-up for 7-10 days.

## 2018-10-26 ENCOUNTER — OFFICE VISIT (OUTPATIENT)
Dept: UROLOGY | Facility: CLINIC | Age: 63
End: 2018-10-26
Payer: MEDICARE

## 2018-10-26 VITALS
SYSTOLIC BLOOD PRESSURE: 131 MMHG | HEIGHT: 68 IN | BODY MASS INDEX: 28.5 KG/M2 | DIASTOLIC BLOOD PRESSURE: 80 MMHG | WEIGHT: 188.06 LBS

## 2018-10-26 DIAGNOSIS — R10.9 RIGHT FLANK PAIN: ICD-10-CM

## 2018-10-26 DIAGNOSIS — N13.30 HYDRONEPHROSIS OF RIGHT KIDNEY: ICD-10-CM

## 2018-10-26 DIAGNOSIS — N20.0 NEPHROLITHIASIS: Primary | ICD-10-CM

## 2018-10-26 LAB
BILIRUB SERPL-MCNC: NEGATIVE MG/DL
BLOOD URINE, POC: NEGATIVE
COLOR, POC UA: YELLOW
GLUCOSE UR QL STRIP: NEGATIVE
KETONES UR QL STRIP: NEGATIVE
LEUKOCYTE ESTERASE URINE, POC: NEGATIVE
NITRITE, POC UA: NEGATIVE
PH, POC UA: 5
PROTEIN, POC: NEGATIVE
SPECIFIC GRAVITY, POC UA: 1020
UROBILINOGEN, POC UA: NEGATIVE

## 2018-10-26 PROCEDURE — 3079F DIAST BP 80-89 MM HG: CPT | Mod: CPTII,,, | Performed by: NURSE PRACTITIONER

## 2018-10-26 PROCEDURE — 3008F BODY MASS INDEX DOCD: CPT | Mod: CPTII,,, | Performed by: NURSE PRACTITIONER

## 2018-10-26 PROCEDURE — 99999 PR PBB SHADOW E&M-EST. PATIENT-LVL IV: CPT | Mod: PBBFAC,,, | Performed by: NURSE PRACTITIONER

## 2018-10-26 PROCEDURE — 99214 OFFICE O/P EST MOD 30 MIN: CPT | Mod: PBBFAC | Performed by: NURSE PRACTITIONER

## 2018-10-26 PROCEDURE — 81001 URINALYSIS AUTO W/SCOPE: CPT | Mod: PBBFAC | Performed by: NURSE PRACTITIONER

## 2018-10-26 PROCEDURE — 3075F SYST BP GE 130 - 139MM HG: CPT | Mod: CPTII,,, | Performed by: NURSE PRACTITIONER

## 2018-10-26 PROCEDURE — 99214 OFFICE O/P EST MOD 30 MIN: CPT | Mod: S$PBB,,, | Performed by: NURSE PRACTITIONER

## 2018-10-26 NOTE — PROGRESS NOTES
Subjective:       Patient ID: Milad Blanco is a 62 y.o. male who was last seen in this office 10/11/2018    Chief Complaint:   Chief Complaint   Patient presents with    Follow-up     Patient didnt get the MALA done.. believes he passed the stones tho       Nephrolithiasis  Patient complains of right flank pain without radiation to the abdomen and testicles. Onset of symptoms was abrupt starting several days ago with unchanged course since that time. Patient describes the pain as colicky, sharp and stabbing, intermittent and rated as moderate. The patient has had no nausea/no vomiting and no diaphoresis. There has been no fever or chills. The patient is not complaining of dysuria or frequency. Risk factors for urolithiasis: history of stone disease, poor fluid intake and tobacco use. He reports ESWL ~40 years ago. He has passed many stones spontaneously    He presented to ED on 10/8/18 with a 5 day history of right flank pain, dysuria and gross hematuria. CT scan showed obstructing right distal ureteral stones x 2 at R UVJ (3-4mm) with moderate hydronephrosis, bilateral non obstructing kidney stones.     He elected to undergo a trial of passage with Flomax. He was scheduled to return with a renal ultrasound but he did not have this done. Today he denies flank pain. He believes he passed his stones but is unsure. Denies gross hematuria, fever or n/v. Overall he feels well.     ACTIVE MEDICAL ISSUES:  Patient Active Problem List   Diagnosis    Back pain    Insomnia    Fatigue    Anxiety    PVD (peripheral vascular disease)    COPD (chronic obstructive pulmonary disease)    Sleep apnea    History of CVA (cerebrovascular accident)    HTN (hypertension)    Hyperlipidemia    Weakness    Stroke    Impaired balance as late effect of cerebrovascular accident (CVA)    Abnormality of gait following cerebrovascular accident (CVA)    Neuropathy    CKD (chronic kidney disease) stage 3, GFR 30-59 ml/min     Chronic back pain    Dizziness    Chronic pain    Acute medial meniscus tear       ALLERGIES AND MEDICATIONS: updated and reviewed.  Review of patient's allergies indicates:   Allergen Reactions    Pcn [penicillins]      Current Outpatient Medications   Medication Sig    albuterol (VENTOLIN HFA) 90 mcg/actuation inhaler INHALE TWO PUFFS BY MOUTH THREE TIMES A DAY AS NEEDED    baclofen (LIORESAL) 20 MG tablet Take 20 mg by mouth 3 (three) times daily.    HYDROcodone-acetaminophen (NORCO) 7.5-325 mg per tablet Take 1 tablet by mouth every 4 (four) hours as needed for Pain.    meloxicam (MOBIC) 15 MG tablet Take 1 tablet (15 mg total) by mouth once daily.    multivitamin (ONE DAILY MULTIVITAMIN) per tablet Take 1 tablet by mouth once daily.    naproxen (NAPROSYN) 500 MG tablet Take 1 tablet (500 mg total) by mouth 2 (two) times daily with meals.    nitroGLYCERIN (NITROSTAT) 0.4 MG SL tablet Place 0.4 mg under the tongue every 5 (five) minutes as needed for Chest pain.    pregabalin (LYRICA) 75 MG capsule Take 75 mg by mouth 2 (two) times daily.    tamsulosin (FLOMAX) 0.4 mg Cap Take 1 capsule (0.4 mg total) by mouth once daily.    aspirin 325 MG tablet Take 1 tablet (325 mg total) by mouth once daily.    atorvastatin (LIPITOR) 40 MG tablet Take 1 tablet (40 mg total) by mouth once daily.    budesonide-formoterol 80-4.5 mcg (SYMBICORT) 80-4.5 mcg/actuation HFAA Inhale 2 puffs into the lungs 2 (two) times daily. Controller    clopidogrel (PLAVIX) 75 mg tablet Take 1 tablet (75 mg total) by mouth once daily.     No current facility-administered medications for this visit.        Review of Systems   Constitutional: Negative for activity change, chills, fatigue, fever and unexpected weight change.   Eyes: Negative for discharge, redness and visual disturbance.   Respiratory: Negative for cough, shortness of breath and wheezing.    Cardiovascular: Negative for chest pain and leg swelling.  "  Gastrointestinal: Negative for abdominal distention, abdominal pain, constipation, diarrhea, nausea and vomiting.   Genitourinary: Negative for decreased urine volume, difficulty urinating, dysuria, flank pain, frequency, hematuria, testicular pain and urgency.   Musculoskeletal: Negative for arthralgias, joint swelling and myalgias.   Skin: Negative for color change and rash.   Neurological: Negative for dizziness and light-headedness.   Psychiatric/Behavioral: Negative for behavioral problems and confusion. The patient is not nervous/anxious.        Objective:      Vitals:    10/26/18 0925   BP: 131/80   Weight: 85.3 kg (188 lb 0.8 oz)   Height: 5' 8" (1.727 m)     Physical Exam   Constitutional: He is oriented to person, place, and time. He appears well-developed.   HENT:   Head: Normocephalic and atraumatic.   Nose: Nose normal.   Eyes: Conjunctivae are normal. Right eye exhibits no discharge. Left eye exhibits no discharge.   Neck: Normal range of motion. Neck supple. No tracheal deviation present. No thyromegaly present.   Cardiovascular: Normal rate and regular rhythm.    Pulmonary/Chest: Effort normal. No respiratory distress. He has no wheezes.   Abdominal: Soft. He exhibits no distension. There is no hepatosplenomegaly. There is no tenderness. There is no CVA tenderness. No hernia.   Genitourinary:   Genitourinary Comments: Patient declined exam   Musculoskeletal: Normal range of motion. He exhibits no edema.   Neurological: He is alert and oriented to person, place, and time.   Skin: Skin is warm and dry. No rash noted. No erythema.     Psychiatric: He has a normal mood and affect. His behavior is normal. Judgment normal.       Urine dipstick shows negative for all components.  Micro exam: negative for WBC's or RBC's.    Assessment:       1. Nephrolithiasis    2. Right flank pain    3. Hydronephrosis of right kidney          Plan:       1. Nephrolithiasis  -CT scan-- obstructing R UVJ stones x 2 with " moderate hydronephrosis, trino non obstructing stones  -s/p trial of passage with Flomax. Uncertain if he passed his stones or not  -Plan to reschedule MALA in 3 weeks  -We discussed red flags. Instructed to present to ER with fever, uncontrollable pain, and/or inability to tolerate PO  - POCT urinalysis, dipstick or tablet reag  - US Retroperitoneal Complete (Kidney and; Future    2. Right flank pain  -currently resolved    3. Hydronephrosis of right kidney  -2/2 R UVJ stones x 2  - US Retroperitoneal Complete (Kidney and; Future              Follow-up in about 3 weeks (around 11/16/2018).

## 2018-11-05 ENCOUNTER — CLINICAL SUPPORT (OUTPATIENT)
Dept: CARDIOLOGY | Facility: HOSPITAL | Age: 63
End: 2018-11-05
Attending: INTERNAL MEDICINE
Payer: MEDICARE

## 2018-11-05 DIAGNOSIS — Z95.818 STATUS POST PLACEMENT OF IMPLANTABLE LOOP RECORDER: ICD-10-CM

## 2018-11-05 DIAGNOSIS — I63.9 CRYPTOGENIC STROKE: ICD-10-CM

## 2018-11-05 PROCEDURE — 93299 CARDIAC DEVICE CHECK CHECK - REMOTE: CPT

## 2018-11-08 PROCEDURE — 93299 PR INTERROG EVAL, REMOTE, UP TO 30 DAYS, CV MON/LOOP REC,TECH REVIEW: CPT | Mod: ,,, | Performed by: INTERNAL MEDICINE

## 2018-11-08 PROCEDURE — 93298 REM INTERROG DEV EVAL SCRMS: CPT | Mod: ,,, | Performed by: INTERNAL MEDICINE

## 2018-11-27 ENCOUNTER — HOSPITAL ENCOUNTER (OUTPATIENT)
Dept: RADIOLOGY | Facility: HOSPITAL | Age: 63
Discharge: HOME | End: 2018-11-27
Attending: NURSE PRACTITIONER
Payer: MEDICARE

## 2018-11-27 DIAGNOSIS — N13.30 HYDRONEPHROSIS OF RIGHT KIDNEY: ICD-10-CM

## 2018-11-27 DIAGNOSIS — N20.0 NEPHROLITHIASIS: ICD-10-CM

## 2018-11-27 PROCEDURE — 76770 US EXAM ABDO BACK WALL COMP: CPT | Mod: 26,,, | Performed by: RADIOLOGY

## 2018-11-27 PROCEDURE — 76770 US EXAM ABDO BACK WALL COMP: CPT | Mod: TC

## 2018-12-03 ENCOUNTER — OFFICE VISIT (OUTPATIENT)
Dept: UROLOGY | Facility: CLINIC | Age: 63
End: 2018-12-03
Payer: MEDICARE

## 2018-12-03 VITALS
HEIGHT: 68 IN | SYSTOLIC BLOOD PRESSURE: 118 MMHG | BODY MASS INDEX: 27.19 KG/M2 | WEIGHT: 179.38 LBS | DIASTOLIC BLOOD PRESSURE: 80 MMHG

## 2018-12-03 DIAGNOSIS — N13.30 HYDRONEPHROSIS OF RIGHT KIDNEY: ICD-10-CM

## 2018-12-03 DIAGNOSIS — R10.9 RIGHT FLANK PAIN: ICD-10-CM

## 2018-12-03 DIAGNOSIS — N20.0 NEPHROLITHIASIS: ICD-10-CM

## 2018-12-03 PROCEDURE — 99214 OFFICE O/P EST MOD 30 MIN: CPT | Mod: 25,S$GLB,, | Performed by: NURSE PRACTITIONER

## 2018-12-03 PROCEDURE — 81001 URINALYSIS AUTO W/SCOPE: CPT | Mod: S$GLB,,, | Performed by: NURSE PRACTITIONER

## 2018-12-03 PROCEDURE — 82365 CALCULUS SPECTROSCOPY: CPT

## 2018-12-03 PROCEDURE — 3079F DIAST BP 80-89 MM HG: CPT | Mod: CPTII,S$GLB,, | Performed by: NURSE PRACTITIONER

## 2018-12-03 PROCEDURE — 88300 SURGICAL PATH GROSS: CPT | Mod: 26,,, | Performed by: PATHOLOGY

## 2018-12-03 PROCEDURE — 3074F SYST BP LT 130 MM HG: CPT | Mod: CPTII,S$GLB,, | Performed by: NURSE PRACTITIONER

## 2018-12-03 PROCEDURE — 99999 PR PBB SHADOW E&M-EST. PATIENT-LVL V: CPT | Mod: PBBFAC,,, | Performed by: NURSE PRACTITIONER

## 2018-12-03 PROCEDURE — 3008F BODY MASS INDEX DOCD: CPT | Mod: CPTII,S$GLB,, | Performed by: NURSE PRACTITIONER

## 2018-12-03 PROCEDURE — 88300 SURGICAL PATH GROSS: CPT | Performed by: PATHOLOGY

## 2018-12-03 NOTE — PROGRESS NOTES
Subjective:       Patient ID: Milad Blanco is a 62 y.o. male who was last seen in this office 10/26/2018    Chief Complaint:   Chief Complaint   Patient presents with    Follow-up     Penidng test results.. says kidneys have been hurting since yesterday       Nephrolithiasis  Patient complains of right flank pain without radiation to the abdomen and testicles. Onset of symptoms was abrupt starting several days ago with unchanged course since that time. Patient describes the pain as colicky, sharp and stabbing, intermittent and rated as moderate. The patient has had no nausea/no vomiting and no diaphoresis. There has been no fever or chills. The patient is not complaining of dysuria or frequency. Risk factors for urolithiasis: history of stone disease, poor fluid intake and tobacco use. He reports ESWL ~40 years ago. He has passed many stones spontaneously    He presented to ED on 10/8/18 with a 5 day history of right flank pain, dysuria and gross hematuria. CT scan showed obstructing right distal ureteral stones x 2 at R UVJ (3-4mm) with moderate hydronephrosis, bilateral non obstructing kidney stones.     He elected to undergo a trial of passage with Flomax. He returns today with a renal ultrasound. He reports passing kidney stones x 2 several days ago--brought specimen in today.  He reports lower back pain that started yesterday. Pain relieved with heating pad.  Denies dysuria, gross hematuria, or flank pain.      ACTIVE MEDICAL ISSUES:  Patient Active Problem List   Diagnosis    Back pain    Insomnia    Fatigue    Anxiety    PVD (peripheral vascular disease)    COPD (chronic obstructive pulmonary disease)    Sleep apnea    History of CVA (cerebrovascular accident)    HTN (hypertension)    Hyperlipidemia    Weakness    Stroke    Impaired balance as late effect of cerebrovascular accident (CVA)    Abnormality of gait following cerebrovascular accident (CVA)    Neuropathy    CKD (chronic  kidney disease) stage 3, GFR 30-59 ml/min    Chronic back pain    Dizziness    Chronic pain    Acute medial meniscus tear    Nephrolithiasis    Right flank pain    Hydronephrosis of right kidney       ALLERGIES AND MEDICATIONS: updated and reviewed.  Review of patient's allergies indicates:   Allergen Reactions    Pcn [penicillins]      Current Outpatient Medications   Medication Sig    albuterol (VENTOLIN HFA) 90 mcg/actuation inhaler INHALE TWO PUFFS BY MOUTH THREE TIMES A DAY AS NEEDED    baclofen (LIORESAL) 20 MG tablet Take 20 mg by mouth 3 (three) times daily.    HYDROcodone-acetaminophen (NORCO) 7.5-325 mg per tablet Take 1 tablet by mouth every 4 (four) hours as needed for Pain.    meloxicam (MOBIC) 15 MG tablet Take 1 tablet (15 mg total) by mouth once daily.    multivitamin (ONE DAILY MULTIVITAMIN) per tablet Take 1 tablet by mouth once daily.    naproxen (NAPROSYN) 500 MG tablet Take 1 tablet (500 mg total) by mouth 2 (two) times daily with meals.    nitroGLYCERIN (NITROSTAT) 0.4 MG SL tablet Place 0.4 mg under the tongue every 5 (five) minutes as needed for Chest pain.    pregabalin (LYRICA) 75 MG capsule Take 75 mg by mouth 2 (two) times daily.    tamsulosin (FLOMAX) 0.4 mg Cap Take 1 capsule (0.4 mg total) by mouth once daily.    aspirin 325 MG tablet Take 1 tablet (325 mg total) by mouth once daily.    atorvastatin (LIPITOR) 40 MG tablet Take 1 tablet (40 mg total) by mouth once daily.    budesonide-formoterol 80-4.5 mcg (SYMBICORT) 80-4.5 mcg/actuation HFAA Inhale 2 puffs into the lungs 2 (two) times daily. Controller    clopidogrel (PLAVIX) 75 mg tablet Take 1 tablet (75 mg total) by mouth once daily.     No current facility-administered medications for this visit.        Review of Systems   Constitutional: Negative for activity change, chills, fatigue, fever and unexpected weight change.   Eyes: Negative for discharge, redness and visual disturbance.   Respiratory: Negative for  "cough, shortness of breath and wheezing.    Cardiovascular: Negative for chest pain and leg swelling.   Gastrointestinal: Negative for abdominal distention, abdominal pain, constipation, diarrhea, nausea and vomiting.   Genitourinary: Negative for decreased urine volume, difficulty urinating, dysuria, flank pain, frequency, hematuria, testicular pain and urgency.   Musculoskeletal: Positive for back pain. Negative for arthralgias, joint swelling and myalgias.   Skin: Negative for color change and rash.   Neurological: Negative for dizziness and light-headedness.   Psychiatric/Behavioral: Negative for behavioral problems and confusion. The patient is not nervous/anxious.        Objective:      Vitals:    12/03/18 0903   BP: 118/80   Weight: 81.4 kg (179 lb 5.5 oz)   Height: 5' 8" (1.727 m)     Physical Exam   Constitutional: He is oriented to person, place, and time. He appears well-developed.   HENT:   Head: Normocephalic and atraumatic.   Nose: Nose normal.   Eyes: Conjunctivae are normal. Right eye exhibits no discharge. Left eye exhibits no discharge.   Neck: Normal range of motion. Neck supple. No tracheal deviation present. No thyromegaly present.   Cardiovascular: Normal rate and regular rhythm.    Pulmonary/Chest: Effort normal. No respiratory distress. He has no wheezes.   Abdominal: Soft. He exhibits no distension. There is no hepatosplenomegaly. There is no tenderness. There is no CVA tenderness. No hernia.   Genitourinary:   Genitourinary Comments: Patient declined exam   Musculoskeletal: Normal range of motion. He exhibits no edema.   Neurological: He is alert and oriented to person, place, and time.   Skin: Skin is warm and dry. No rash noted. No erythema.          Location of patient's pain   Psychiatric: He has a normal mood and affect. His behavior is normal. Judgment normal.       Urine dipstick shows negative for all components.  Micro exam: negative for WBC's or RBC's.    Narrative "     EXAMINATION:  US RETROPERITONEAL COMPLETE    CLINICAL HISTORY:  Calculus of kidney    TECHNIQUE:  Ultrasound of the kidneys and urinary bladder was performed including color flow and Doppler evaluation of the kidneys.    COMPARISON:  CT scan of the abdomen 10/08/2018    FINDINGS:  The right kidney measures 10.8 x 5.3 x 4.9 cm with a resistive index of 0.62.  The previously noted hydronephrosis on the right has significantly resolved since the previous study.  There is calcification in the lower pole, midpole region representing nonobstructing renal calculi.  There is no abnormal perinephric fluid collections.    In addition there are at least 2 renal cysts in the midpole measuring 1.5 and 0.9 cm in maximum diameters.    The left kidney measures 10.1 x 5 x 6.8 cm with a resistive index of 0.56.  There are at least 3 calcifications in the upper, the mid and the lower pole measuring approximately 0.5 cm in diameter and representing nonobstructing renal calculi.    The urinary bladder is unremarkable.   Impression       1. Resolution of the right-sided hydronephrosis since the previous CT scan of 10/08/2018.  2. Bilateral nonobstructing renal calculi as above.      Electronically signed by: Nba Christensen MD  Date: 11/27/2018  Time: 16:20     Reviewed with patient    Assessment:       1. Nephrolithiasis    2. Right flank pain    3. Hydronephrosis of right kidney          Plan:       1. Nephrolithiasis  -CT scan-- obstructing R UVJ stones x 2 with moderate hydronephrosis, trino non obstructing stones  -s/p trial of passage with Flomax--passed stones x 2  -MALA 11/2018--resolution of right hydronephrosis, bilateral non obstructing kidney stones  - POCT urinalysis, dipstick or tablet reag  - Tissue Specimen To Pathology, Urology  - Urinary Stone Analysis  - X-Ray Abdomen AP 1 View; Future  - US Retroperitoneal Complete (Kidney and; Future    2. Right flank pain  -currently resolved  -Now with lower back pain--suspect more  musculoskeletal in nature. Continue heating pad prn, NSAIDs prn, rest    3. Hydronephrosis of right kidney  -2/2 R UVJ stones x 2  -MALA 11/2018--resolution of hydronephrosis, bilateral non obstructing kidney stones          Follow-up in about 3 months (around 3/3/2019) for Review X-ray.

## 2018-12-04 LAB
BILIRUB SERPL-MCNC: NORMAL MG/DL
BLOOD URINE, POC: NORMAL
COLOR, POC UA: YELLOW
GLUCOSE UR QL STRIP: NORMAL
KETONES UR QL STRIP: NORMAL
LEUKOCYTE ESTERASE URINE, POC: NORMAL
NITRITE, POC UA: NORMAL
PH, POC UA: 5
PROTEIN, POC: NORMAL
SPECIFIC GRAVITY, POC UA: 1010
UROBILINOGEN, POC UA: NORMAL

## 2018-12-05 ENCOUNTER — CLINICAL SUPPORT (OUTPATIENT)
Dept: CARDIOLOGY | Facility: HOSPITAL | Age: 63
End: 2018-12-05
Attending: INTERNAL MEDICINE
Payer: MEDICARE

## 2018-12-05 DIAGNOSIS — Z95.818 STATUS POST PLACEMENT OF IMPLANTABLE LOOP RECORDER: ICD-10-CM

## 2018-12-05 DIAGNOSIS — I63.9 CRYPTOGENIC STROKE: ICD-10-CM

## 2018-12-05 PROCEDURE — 93299 CARDIAC DEVICE CHECK CHECK - REMOTE: CPT

## 2018-12-06 PROCEDURE — 93298 REM INTERROG DEV EVAL SCRMS: CPT | Mod: ,,, | Performed by: INTERNAL MEDICINE

## 2018-12-06 PROCEDURE — 93299 PR INTERROG EVAL, REMOTE, UP TO 30 DAYS, CV MON/LOOP REC,TECH REVIEW: CPT | Mod: ,,, | Performed by: INTERNAL MEDICINE

## 2018-12-07 LAB
ANNOTATION COMMENT IMP: NORMAL
COMPN STONE: NORMAL
SPECIMEN SOURCE: NORMAL
STONE ANALYSIS IR-IMP: NORMAL

## 2019-01-04 ENCOUNTER — CLINICAL SUPPORT (OUTPATIENT)
Dept: CARDIOLOGY | Facility: HOSPITAL | Age: 64
End: 2019-01-04
Attending: INTERNAL MEDICINE
Payer: MEDICARE

## 2019-01-04 DIAGNOSIS — Z46.9 FITTING AND ADJUSTMENT OF DEVICE: ICD-10-CM

## 2019-01-04 PROCEDURE — 93299 CARDIAC DEVICE CHECK - REMOTE: CPT

## 2019-01-21 DIAGNOSIS — Z46.9 FITTING AND ADJUSTMENT OF DEVICE: Primary | ICD-10-CM

## 2019-02-12 ENCOUNTER — CLINICAL SUPPORT (OUTPATIENT)
Dept: CARDIOLOGY | Facility: HOSPITAL | Age: 64
End: 2019-02-12
Attending: INTERNAL MEDICINE
Payer: MEDICARE

## 2019-02-12 DIAGNOSIS — Z46.9 FITTING AND ADJUSTMENT OF DEVICE: ICD-10-CM

## 2019-02-12 PROCEDURE — 93299 CARDIAC DEVICE CHECK - REMOTE: CPT

## 2019-02-14 DIAGNOSIS — Z46.9 FITTING AND ADJUSTMENT OF DEVICE: Primary | ICD-10-CM

## 2019-02-18 ENCOUNTER — HOSPITAL ENCOUNTER (OUTPATIENT)
Dept: RADIOLOGY | Facility: HOSPITAL | Age: 64
Discharge: HOME | End: 2019-02-18
Attending: NURSE PRACTITIONER
Payer: MEDICARE

## 2019-02-18 DIAGNOSIS — N20.0 NEPHROLITHIASIS: ICD-10-CM

## 2019-02-18 PROCEDURE — 76770 US EXAM ABDO BACK WALL COMP: CPT | Mod: 26,,, | Performed by: RADIOLOGY

## 2019-02-18 PROCEDURE — 74018 RADEX ABDOMEN 1 VIEW: CPT | Mod: TC,FY

## 2019-02-18 PROCEDURE — 74018 XR ABDOMEN AP 1 VIEW: ICD-10-PCS | Mod: 26,,, | Performed by: RADIOLOGY

## 2019-02-18 PROCEDURE — 76770 US EXAM ABDO BACK WALL COMP: CPT | Mod: TC

## 2019-02-18 PROCEDURE — 76770 US RETROPERITONEAL COMPLETE: ICD-10-PCS | Mod: 26,,, | Performed by: RADIOLOGY

## 2019-02-18 PROCEDURE — 74018 RADEX ABDOMEN 1 VIEW: CPT | Mod: 26,,, | Performed by: RADIOLOGY

## 2019-03-07 ENCOUNTER — OFFICE VISIT (OUTPATIENT)
Dept: UROLOGY | Facility: CLINIC | Age: 64
End: 2019-03-07
Payer: MEDICARE

## 2019-03-07 VITALS
HEIGHT: 68 IN | DIASTOLIC BLOOD PRESSURE: 80 MMHG | BODY MASS INDEX: 28.88 KG/M2 | WEIGHT: 190.56 LBS | SYSTOLIC BLOOD PRESSURE: 124 MMHG

## 2019-03-07 DIAGNOSIS — N20.0 NEPHROLITHIASIS: Primary | ICD-10-CM

## 2019-03-07 LAB
BILIRUB SERPL-MCNC: NORMAL MG/DL
BLOOD URINE, POC: NORMAL
COLOR, POC UA: NORMAL
GLUCOSE UR QL STRIP: NORMAL
KETONES UR QL STRIP: NORMAL
LEUKOCYTE ESTERASE URINE, POC: NORMAL
NITRITE, POC UA: NORMAL
PH, POC UA: 5
PROTEIN, POC: NORMAL
SPECIFIC GRAVITY, POC UA: 1015
UROBILINOGEN, POC UA: NORMAL

## 2019-03-07 PROCEDURE — 3079F PR MOST RECENT DIASTOLIC BLOOD PRESSURE 80-89 MM HG: ICD-10-PCS | Mod: CPTII,S$GLB,, | Performed by: NURSE PRACTITIONER

## 2019-03-07 PROCEDURE — 3074F SYST BP LT 130 MM HG: CPT | Mod: CPTII,S$GLB,, | Performed by: NURSE PRACTITIONER

## 2019-03-07 PROCEDURE — 81001 POCT URINALYSIS, DIPSTICK OR TABLET REAGENT, AUTOMATED, WITH MICROSCOP: ICD-10-PCS | Mod: S$GLB,,, | Performed by: NURSE PRACTITIONER

## 2019-03-07 PROCEDURE — 1101F PR PT FALLS ASSESS DOC 0-1 FALLS W/OUT INJ PAST YR: ICD-10-PCS | Mod: CPTII,S$GLB,, | Performed by: NURSE PRACTITIONER

## 2019-03-07 PROCEDURE — 99999 PR PBB SHADOW E&M-EST. PATIENT-LVL IV: ICD-10-PCS | Mod: PBBFAC,,, | Performed by: NURSE PRACTITIONER

## 2019-03-07 PROCEDURE — 99213 PR OFFICE/OUTPT VISIT, EST, LEVL III, 20-29 MIN: ICD-10-PCS | Mod: 25,S$GLB,, | Performed by: NURSE PRACTITIONER

## 2019-03-07 PROCEDURE — 3008F BODY MASS INDEX DOCD: CPT | Mod: CPTII,S$GLB,, | Performed by: NURSE PRACTITIONER

## 2019-03-07 PROCEDURE — 3079F DIAST BP 80-89 MM HG: CPT | Mod: CPTII,S$GLB,, | Performed by: NURSE PRACTITIONER

## 2019-03-07 PROCEDURE — 3074F PR MOST RECENT SYSTOLIC BLOOD PRESSURE < 130 MM HG: ICD-10-PCS | Mod: CPTII,S$GLB,, | Performed by: NURSE PRACTITIONER

## 2019-03-07 PROCEDURE — 81001 URINALYSIS AUTO W/SCOPE: CPT | Mod: S$GLB,,, | Performed by: NURSE PRACTITIONER

## 2019-03-07 PROCEDURE — 1101F PT FALLS ASSESS-DOCD LE1/YR: CPT | Mod: CPTII,S$GLB,, | Performed by: NURSE PRACTITIONER

## 2019-03-07 PROCEDURE — 99213 OFFICE O/P EST LOW 20 MIN: CPT | Mod: 25,S$GLB,, | Performed by: NURSE PRACTITIONER

## 2019-03-07 PROCEDURE — 3008F PR BODY MASS INDEX (BMI) DOCUMENTED: ICD-10-PCS | Mod: CPTII,S$GLB,, | Performed by: NURSE PRACTITIONER

## 2019-03-07 PROCEDURE — 99999 PR PBB SHADOW E&M-EST. PATIENT-LVL IV: CPT | Mod: PBBFAC,,, | Performed by: NURSE PRACTITIONER

## 2019-03-07 NOTE — PROGRESS NOTES
Subjective:       Patient ID: Milad Blanco is a 63 y.o. male who is an established patient was last seen in this office 12/3/2018    Chief Complaint:   Chief Complaint   Patient presents with    Follow-up     Pending test results.. Patient states thats everyhting has been fine.. no new issues     Nephrolithiasis  Patient complains of right flank pain without radiation to the abdomen and testicles. Onset of symptoms was abrupt starting several days ago with unchanged course since that time. Patient describes the pain as colicky, sharp and stabbing, intermittent and rated as moderate. The patient has had no nausea/no vomiting and no diaphoresis. There has been no fever or chills. The patient is not complaining of dysuria or frequency. Risk factors for urolithiasis: history of stone disease, poor fluid intake and tobacco use. He reports ESWL ~40 years ago. He has passed many stones spontaneously    He presented to ED on 10/8/18 with a 5 day history of right flank pain, dysuria and gross hematuria. CT scan showed obstructing right distal ureteral stones x 2 at R UVJ (3-4mm) with moderate hydronephrosis, bilateral non obstructing kidney stones.     He elected to undergo a trial of passage with Flomax. He reported passing 2 kidney stones during previous visit--brought in specimen.    Stone analysis: 100% Calcium oxalate dihydrate     MALA 11/2018--resolution of right hydronephrosis, bilateral non obstructing kidney stones. He returns today with a KUB and renal ultrasound. No new complaints      ACTIVE MEDICAL ISSUES:  Patient Active Problem List   Diagnosis    Back pain    Insomnia    Fatigue    Anxiety    PVD (peripheral vascular disease)    COPD (chronic obstructive pulmonary disease)    Sleep apnea    History of CVA (cerebrovascular accident)    HTN (hypertension)    Hyperlipidemia    Weakness    Stroke    Impaired balance as late effect of cerebrovascular accident (CVA)    Abnormality of gait  following cerebrovascular accident (CVA)    Neuropathy    CKD (chronic kidney disease) stage 3, GFR 30-59 ml/min    Chronic back pain    Dizziness    Chronic pain    Acute medial meniscus tear    Nephrolithiasis    Right flank pain    Hydronephrosis of right kidney       ALLERGIES AND MEDICATIONS: updated and reviewed.  Review of patient's allergies indicates:   Allergen Reactions    Pcn [penicillins]      Current Outpatient Medications   Medication Sig    albuterol (VENTOLIN HFA) 90 mcg/actuation inhaler INHALE TWO PUFFS BY MOUTH THREE TIMES A DAY AS NEEDED    baclofen (LIORESAL) 20 MG tablet Take 20 mg by mouth 3 (three) times daily.    HYDROcodone-acetaminophen (NORCO) 7.5-325 mg per tablet Take 1 tablet by mouth every 4 (four) hours as needed for Pain.    meloxicam (MOBIC) 15 MG tablet Take 1 tablet (15 mg total) by mouth once daily.    multivitamin (ONE DAILY MULTIVITAMIN) per tablet Take 1 tablet by mouth once daily.    naproxen (NAPROSYN) 500 MG tablet Take 1 tablet (500 mg total) by mouth 2 (two) times daily with meals.    nitroGLYCERIN (NITROSTAT) 0.4 MG SL tablet Place 0.4 mg under the tongue every 5 (five) minutes as needed for Chest pain.    pregabalin (LYRICA) 75 MG capsule Take 75 mg by mouth 2 (two) times daily.    tamsulosin (FLOMAX) 0.4 mg Cap Take 1 capsule (0.4 mg total) by mouth once daily.    aspirin 325 MG tablet Take 1 tablet (325 mg total) by mouth once daily.    atorvastatin (LIPITOR) 40 MG tablet Take 1 tablet (40 mg total) by mouth once daily.    budesonide-formoterol 80-4.5 mcg (SYMBICORT) 80-4.5 mcg/actuation HFAA Inhale 2 puffs into the lungs 2 (two) times daily. Controller    clopidogrel (PLAVIX) 75 mg tablet Take 1 tablet (75 mg total) by mouth once daily.     No current facility-administered medications for this visit.        Review of Systems   Constitutional: Negative for activity change, chills, fatigue, fever and unexpected weight change.   Eyes: Negative  "for discharge, redness and visual disturbance.   Respiratory: Negative for cough, shortness of breath and wheezing.    Cardiovascular: Negative for chest pain and leg swelling.   Gastrointestinal: Negative for abdominal distention, abdominal pain, constipation, diarrhea, nausea and vomiting.   Genitourinary: Negative for decreased urine volume, difficulty urinating, discharge, dysuria, flank pain, frequency, hematuria, penile pain, testicular pain and urgency.   Musculoskeletal: Negative for arthralgias, joint swelling and myalgias.   Skin: Negative for color change and rash.   Neurological: Negative for dizziness and light-headedness.   Psychiatric/Behavioral: Negative for behavioral problems and confusion. The patient is not nervous/anxious.        Objective:      Vitals:    03/07/19 0838   BP: 124/80   Weight: 86.4 kg (190 lb 9.4 oz)   Height: 5' 8" (1.727 m)     Physical Exam   Constitutional: He is oriented to person, place, and time. He appears well-developed.   HENT:   Head: Normocephalic and atraumatic.   Nose: Nose normal.   Eyes: Conjunctivae are normal. Right eye exhibits no discharge. Left eye exhibits no discharge.   Neck: Normal range of motion. Neck supple. No tracheal deviation present. No thyromegaly present.   Cardiovascular: Normal rate and regular rhythm.    Pulmonary/Chest: Effort normal. No respiratory distress. He has no wheezes.   Abdominal: Soft. He exhibits no distension. There is no hepatosplenomegaly. There is no tenderness. There is no CVA tenderness. No hernia.   Genitourinary:   Genitourinary Comments: Patient declined exam   Musculoskeletal: Normal range of motion. He exhibits no edema.   Neurological: He is alert and oriented to person, place, and time.   Skin: Skin is warm and dry. No rash noted. No erythema.     Psychiatric: He has a normal mood and affect. His behavior is normal. Judgment normal.       Urine dipstick shows negative for all components.  Micro exam: negative for " WBC's or RBC's.    Narrative     EXAMINATION:  US RETROPERITONEAL COMPLETE    CLINICAL HISTORY:  Calculus of kidney    TECHNIQUE:  Ultrasound of the kidneys and urinary bladder was performed including color flow and Doppler evaluation of the kidneys.    COMPARISON:  11/27/2018    FINDINGS:  The right kidney is normal in length measuring 10.9 cm. The left kidney is normal in length measuring 10.6 cm. Segmental arterial resistive indices are within normal limits.  Bilateral renal cortical thinning noted.  Right midpole cyst measuring 1.3 cm, lower pole cyst measuring 0.9 cm.  There is a 5 mm echogenic foci in the right lower pole suggestive of a calcification.  There is a 5 mm echogenic foci in left upper pole, 7 mm foci in the midpole, and 4 mm foci in the left lower pole, also suggestive of calcifications.  No hydronephrosis or renal masses identified.  The bladder is grossly unremarkable.    Ill-defined hyperechoic 1.2 cm area noted in the right hepatic lobe, probable focal fatty change.   Impression       Bilateral nonobstructing renal stones.    Small right renal cysts.      Electronically signed by: Abner Chester MD  Date: 02/18/2019  Time: 11:07     Narrative     EXAMINATION:  XR ABDOMEN AP 1 VIEW    CLINICAL HISTORY:  Calculus of kidney    TECHNIQUE:  Single AP View of the abdomen was performed.    COMPARISON:  CT renal stone study 10/08/2018    FINDINGS:  The nonobstructing renal calculi on prior CT examination are not well delineated on today's study, likely obscured by overlying bowel gas.  No definite new renal or ureteral calculi are identified.    Bowel gas pattern appears nonobstructive.  No dilated loops of bowel identified.  No obvious free air.    Cholecystectomy clips.    Scattered vascular calcification.   Impression       No acute process identified.      Electronically signed by: Peter Blakely MD  Date: 02/18/2019  Time: 10:02     Imaging reviewed with patient    Assessment:       1.  Nephrolithiasis          Plan:       1. Nephrolithiasis  -CT scan-- obstructing R UVJ stones x 2 with moderate hydronephrosis, trino non obstructing stones  -s/p trial of passage with Flomax--passed stones x 2  -Stone analysis: 100% Calcium oxalate dihydrate   -MALA 11/2018--resolution of right hydronephrosis, bilateral non obstructing kidney stones  -MALA 2/2019--bilateral non obstructing stones, largest on R--5 mm, L--7mm  -KUB 2/2019--stones not visualized  -Discussed stone prevention. Handout provided  - POCT urinalysis, dipstick or tablet reag  - X-Ray Abdomen AP 1 View; Future              Follow-up in about 6 months (around 9/7/2019) for Review X-ray.

## 2019-03-07 NOTE — PATIENT INSTRUCTIONS
"1. Eat fewer high-oxalate foods.  The first suggestion is the most obvious. The more oxalate that is absorbed from your digestive tract, the more oxalate in your urine. High-oxalate foods to limit, if you eat them, are:   Spinach   Bran flakes   Rhubarb   Beets   Potato chips   French fries   Nuts and nut butters   You do not need to cut out other healthy foods that provide some oxalate. In fact, oxalate is practically unavoidable, because most plant foods have some. Often a combination of calcium from foods or beverages with meals and fewer high-oxalate foods is required.    2. Increase the amount of calcium in your diet.  Low amounts of calcium in your diet will increase your chances of forming calcium oxalate kidney stones. Many people are afraid to eat calcium because of the name "calcium oxalate stones." However, calcium binds oxalate in the intestines. A diet rich in calcium helps reduce the amount of oxalate being absorbed by your body, so stones are less likely to form. Eat calcium rich foods and beverages every day (2 to 3 servings) from dairy foods or other calcium-rich foods.   Also, eating high calcium foods at the same time as high oxalate food is helpful; for example have low fat cheese with a spinach salad or yogurt with berries. If you take a calcium supplement, calcium citrate is the preferred form.    3. Limit the vitamin C content of your diet.  Oxalate is produced as an end product of Vitamin C (ascorbic acid) metabolism. Large doses of Vitamin C may increase the amount of oxalate in your urine, increasing the risk of kidney stone formation. If you are taking a supplement, do not take more than 500 mg of Vitamin C daily.    4. Drink the right amount of fluids every day.  It is very important to drink plenty of liquids. Your goal should be 10-12 glasses a day. At least 5-6 glasses should be water. You may also want to consider drinking lemonade. Research suggests that lemonade may be helpful in " reducing the risk of calcium oxalate stone formation.    5. Eating the right amount of protein daily.  Eating large amounts of protein may increase the risk of kidney stone formation. Your daily protein needs can usually be met with 2-3 servings a day, or 4 to 6 ounces. Eating more than this if you are at risk at kidney stones is unnecessary.    6. Reduce the amount of sodium in your diet.  Reduce the amount of sodium in your diet to 2-3 grams per day. Limit eating processed foods such as hot dogs, deli meats, sausage, canned products, dry soup mixes, sauerkraut, pickles, and various convenience mixes.    Use the ChooseMyPlate.gov web site to plan a well-balanced diet. Carbohydrates, proteins, and fats are necessary for the proper functioning, maintenance, and repair of your body. In addition to these major nutrients, the body requires water, minerals, and vitamins for good health.

## 2019-03-14 ENCOUNTER — CLINICAL SUPPORT (OUTPATIENT)
Dept: CARDIOLOGY | Facility: HOSPITAL | Age: 64
End: 2019-03-14
Attending: INTERNAL MEDICINE
Payer: MEDICARE

## 2019-03-14 DIAGNOSIS — Z46.9 FITTING AND ADJUSTMENT OF DEVICE: ICD-10-CM

## 2019-03-14 PROCEDURE — 93299 CARDIAC DEVICE CHECK - REMOTE: CPT

## 2019-03-15 ENCOUNTER — TELEPHONE (OUTPATIENT)
Dept: ELECTROPHYSIOLOGY | Facility: CLINIC | Age: 64
End: 2019-03-15

## 2019-03-15 NOTE — TELEPHONE ENCOUNTER
----- Message from Domingo Nuno MD sent at 3/14/2019  4:07 PM CDT -----  Offer removal vs leaving in situ  ----- Message -----  From: Buffy Martinez  Sent: 3/14/2019   2:37 PM  To: Domingo Nuno MD    [employer] ILR at [facility] (since 3/13/19)    Implanted for CVA, r/o occult AF    No AF noted over lifetime of device.    Please advise.    Thanks,  Buffy

## 2019-03-15 NOTE — TELEPHONE ENCOUNTER
Spoke with patient's wife and discussed ILR at [facility] and Dr Nuno's recommendation to offer removal without replacement vs just leaving it in situ with the understanding that once the battery dies, we will not receive any information from the device. She wants to speak with patient and requests that I call back on Monday. Reminder set.

## 2019-03-18 ENCOUNTER — TELEPHONE (OUTPATIENT)
Dept: ELECTROPHYSIOLOGY | Facility: CLINIC | Age: 64
End: 2019-03-18

## 2019-03-18 NOTE — TELEPHONE ENCOUNTER
----- Message from Sakina Grigsby RN sent at 3/15/2019  1:22 PM CDT -----  Call wife back to get decision on ILR removal vs leaving in situ

## 2019-03-18 NOTE — TELEPHONE ENCOUNTER
Spoke with son (he has his dad's phone for the day). States he will ask his mom to call me back with decision regarding ILR removal vs leaving in situ as battery is approaching EOL.

## 2019-04-04 ENCOUNTER — CLINICAL SUPPORT (OUTPATIENT)
Dept: CARDIOLOGY | Facility: HOSPITAL | Age: 64
End: 2019-04-04
Attending: INTERNAL MEDICINE
Payer: MEDICARE

## 2019-04-04 DIAGNOSIS — Z46.9 FITTING AND ADJUSTMENT OF DEVICE: ICD-10-CM

## 2019-04-04 PROCEDURE — 93299 CARDIAC DEVICE CHECK - REMOTE: CPT

## 2019-05-06 ENCOUNTER — CLINICAL SUPPORT (OUTPATIENT)
Dept: CARDIOLOGY | Facility: HOSPITAL | Age: 64
End: 2019-05-06
Attending: INTERNAL MEDICINE
Payer: MEDICARE

## 2019-05-06 DIAGNOSIS — Z46.9 FITTING AND ADJUSTMENT OF DEVICE: ICD-10-CM

## 2019-05-06 PROCEDURE — 93299 CARDIAC DEVICE CHECK - REMOTE: CPT

## 2019-05-23 ENCOUNTER — HOSPITAL ENCOUNTER (OUTPATIENT)
Dept: RADIOLOGY | Facility: HOSPITAL | Age: 64
Discharge: HOME | End: 2019-05-23
Attending: NURSE PRACTITIONER
Payer: MEDICARE

## 2019-05-23 DIAGNOSIS — N20.0 NEPHROLITHIASIS: ICD-10-CM

## 2019-05-23 PROCEDURE — 74018 RADEX ABDOMEN 1 VIEW: CPT | Mod: 26,,, | Performed by: RADIOLOGY

## 2019-05-23 PROCEDURE — 74018 RADEX ABDOMEN 1 VIEW: CPT | Mod: TC,FY

## 2019-05-23 PROCEDURE — 74018 XR ABDOMEN AP 1 VIEW: ICD-10-PCS | Mod: 26,,, | Performed by: RADIOLOGY

## 2019-09-09 ENCOUNTER — OFFICE VISIT (OUTPATIENT)
Dept: UROLOGY | Facility: CLINIC | Age: 64
End: 2019-09-09
Payer: MEDICARE

## 2019-09-09 VITALS
BODY MASS INDEX: 28.79 KG/M2 | WEIGHT: 190 LBS | HEIGHT: 68 IN | SYSTOLIC BLOOD PRESSURE: 127 MMHG | DIASTOLIC BLOOD PRESSURE: 80 MMHG

## 2019-09-09 DIAGNOSIS — N20.0 NEPHROLITHIASIS: Primary | ICD-10-CM

## 2019-09-09 LAB
BILIRUB SERPL-MCNC: NORMAL MG/DL
BLOOD URINE, POC: NORMAL
COLOR, POC UA: YELLOW
GLUCOSE UR QL STRIP: NORMAL
KETONES UR QL STRIP: NORMAL
LEUKOCYTE ESTERASE URINE, POC: NORMAL
NITRITE, POC UA: NORMAL
PH, POC UA: 6
PROTEIN, POC: NORMAL
SPECIFIC GRAVITY, POC UA: 1015
UROBILINOGEN, POC UA: NORMAL

## 2019-09-09 PROCEDURE — 99999 PR PBB SHADOW E&M-EST. PATIENT-LVL IV: ICD-10-PCS | Mod: PBBFAC,,, | Performed by: NURSE PRACTITIONER

## 2019-09-09 PROCEDURE — 1101F PR PT FALLS ASSESS DOC 0-1 FALLS W/OUT INJ PAST YR: ICD-10-PCS | Mod: CPTII,S$GLB,, | Performed by: NURSE PRACTITIONER

## 2019-09-09 PROCEDURE — 81001 POCT URINALYSIS, DIPSTICK OR TABLET REAGENT, AUTOMATED, WITH MICROSCOP: ICD-10-PCS | Mod: S$GLB,,, | Performed by: NURSE PRACTITIONER

## 2019-09-09 PROCEDURE — 3079F DIAST BP 80-89 MM HG: CPT | Mod: CPTII,S$GLB,, | Performed by: NURSE PRACTITIONER

## 2019-09-09 PROCEDURE — 3079F PR MOST RECENT DIASTOLIC BLOOD PRESSURE 80-89 MM HG: ICD-10-PCS | Mod: CPTII,S$GLB,, | Performed by: NURSE PRACTITIONER

## 2019-09-09 PROCEDURE — 3074F PR MOST RECENT SYSTOLIC BLOOD PRESSURE < 130 MM HG: ICD-10-PCS | Mod: CPTII,S$GLB,, | Performed by: NURSE PRACTITIONER

## 2019-09-09 PROCEDURE — 99213 PR OFFICE/OUTPT VISIT, EST, LEVL III, 20-29 MIN: ICD-10-PCS | Mod: 25,S$GLB,, | Performed by: NURSE PRACTITIONER

## 2019-09-09 PROCEDURE — 99999 PR PBB SHADOW E&M-EST. PATIENT-LVL IV: CPT | Mod: PBBFAC,,, | Performed by: NURSE PRACTITIONER

## 2019-09-09 PROCEDURE — 3008F BODY MASS INDEX DOCD: CPT | Mod: CPTII,S$GLB,, | Performed by: NURSE PRACTITIONER

## 2019-09-09 PROCEDURE — 3008F PR BODY MASS INDEX (BMI) DOCUMENTED: ICD-10-PCS | Mod: CPTII,S$GLB,, | Performed by: NURSE PRACTITIONER

## 2019-09-09 PROCEDURE — 81001 URINALYSIS AUTO W/SCOPE: CPT | Mod: S$GLB,,, | Performed by: NURSE PRACTITIONER

## 2019-09-09 PROCEDURE — 99213 OFFICE O/P EST LOW 20 MIN: CPT | Mod: 25,S$GLB,, | Performed by: NURSE PRACTITIONER

## 2019-09-09 PROCEDURE — 1101F PT FALLS ASSESS-DOCD LE1/YR: CPT | Mod: CPTII,S$GLB,, | Performed by: NURSE PRACTITIONER

## 2019-09-09 PROCEDURE — 3074F SYST BP LT 130 MM HG: CPT | Mod: CPTII,S$GLB,, | Performed by: NURSE PRACTITIONER

## 2019-09-09 RX ORDER — PROMETHAZINE HYDROCHLORIDE 25 MG/1
25 TABLET ORAL
COMMUNITY
Start: 2017-07-03

## 2019-09-09 RX ORDER — HYOSCYAMINE SULFATE 0.125 MG
0.12 TABLET ORAL
COMMUNITY
Start: 2017-07-03

## 2019-09-09 RX ORDER — BUPROPION HYDROCHLORIDE 100 MG/1
100 TABLET ORAL
COMMUNITY

## 2019-09-09 NOTE — PROGRESS NOTES
Subjective:       Patient ID: Milad Blanco is a 63 y.o. male who was last seen in this office 3/7/19    Chief Complaint:   Chief Complaint   Patient presents with    Follow-up     Here for KUB results .. no new issues stated       Nephrolithiasis  Patient complains of right flank pain without radiation to the abdomen and testicles. Onset of symptoms was abrupt starting several days ago with unchanged course since that time. Patient describes the pain as colicky, sharp and stabbing, intermittent and rated as moderate. The patient has had no nausea/no vomiting and no diaphoresis. There has been no fever or chills. The patient is not complaining of dysuria or frequency. Risk factors for urolithiasis: history of stone disease, poor fluid intake and tobacco use. He reports ESWL ~40 years ago. He has passed many stones spontaneously    He presented to ED on 10/8/18 with a 5 day history of right flank pain, dysuria and gross hematuria. CT scan showed obstructing right distal ureteral stones x 2 at R UVJ (3-4mm) with moderate hydronephrosis, bilateral non obstructing kidney stones.     He elected to undergo a trial of passage with Flomax. He reported passing 2 kidney stones during previous visit--brought in specimen.    Stone analysis: 100% Calcium oxalate dihydrate     MALA 11/2018--resolution of right hydronephrosis, bilateral non obstructing kidney stones    MALA 2/2019--bilateral non obstructing stones, largest on R--5 mm, L--7mm  KUB 2/2019--stones not visualized    He is back today with a KUB. Denies dysuria, gross hematuria or flank pain. No new complaints    ACTIVE MEDICAL ISSUES:  Patient Active Problem List   Diagnosis    Back pain    Insomnia    Fatigue    Anxiety    PVD (peripheral vascular disease)    COPD (chronic obstructive pulmonary disease)    Sleep apnea    History of CVA (cerebrovascular accident)    HTN (hypertension)    Hyperlipidemia    Weakness    Stroke    Impaired balance as  late effect of cerebrovascular accident (CVA)    Abnormality of gait following cerebrovascular accident (CVA)    Neuropathy    CKD (chronic kidney disease) stage 3, GFR 30-59 ml/min    Chronic back pain    Dizziness    Chronic pain    Acute medial meniscus tear    Nephrolithiasis    Right flank pain    Hydronephrosis of right kidney       ALLERGIES AND MEDICATIONS: updated and reviewed.  Review of patient's allergies indicates:   Allergen Reactions    Pcn [penicillins]      Current Outpatient Medications   Medication Sig    albuterol (VENTOLIN HFA) 90 mcg/actuation inhaler INHALE TWO PUFFS BY MOUTH THREE TIMES A DAY AS NEEDED    baclofen (LIORESAL) 20 MG tablet Take 20 mg by mouth 3 (three) times daily.    buPROPion (WELLBUTRIN) 100 MG tablet Take 100 mg by mouth.    HYDROcodone-acetaminophen (NORCO) 7.5-325 mg per tablet Take 1 tablet by mouth every 4 (four) hours as needed for Pain.    hyoscyamine (ANASPAZ,LEVSIN) 0.125 mg Tab Take 0.125 mg by mouth.    meloxicam (MOBIC) 15 MG tablet Take 1 tablet (15 mg total) by mouth once daily.    multivitamin (ONE DAILY MULTIVITAMIN) per tablet Take 1 tablet by mouth once daily.    naproxen (NAPROSYN) 500 MG tablet Take 1 tablet (500 mg total) by mouth 2 (two) times daily with meals.    nitroGLYCERIN (NITROSTAT) 0.4 MG SL tablet Place 0.4 mg under the tongue every 5 (five) minutes as needed for Chest pain.    pregabalin (LYRICA) 75 MG capsule Take 75 mg by mouth 2 (two) times daily.    promethazine (PHENERGAN) 25 MG tablet Take 25 mg by mouth.    tamsulosin (FLOMAX) 0.4 mg Cap Take 1 capsule (0.4 mg total) by mouth once daily.    aspirin 325 MG tablet Take 1 tablet (325 mg total) by mouth once daily.    atorvastatin (LIPITOR) 40 MG tablet Take 1 tablet (40 mg total) by mouth once daily.    budesonide-formoterol 80-4.5 mcg (SYMBICORT) 80-4.5 mcg/actuation HFAA Inhale 2 puffs into the lungs 2 (two) times daily. Controller    clopidogrel (PLAVIX) 75 mg  "tablet Take 1 tablet (75 mg total) by mouth once daily.     No current facility-administered medications for this visit.        Review of Systems   Constitutional: Negative for activity change, chills, fatigue, fever and unexpected weight change.   Eyes: Negative for discharge, redness and visual disturbance.   Respiratory: Negative for cough, shortness of breath and wheezing.    Cardiovascular: Negative for chest pain and leg swelling.   Gastrointestinal: Negative for abdominal distention, abdominal pain, constipation, diarrhea, nausea and vomiting.   Genitourinary: Negative for decreased urine volume, difficulty urinating, discharge, dysuria, flank pain, frequency, hematuria, penile pain, scrotal swelling, testicular pain and urgency.   Musculoskeletal: Negative for arthralgias, joint swelling and myalgias.   Skin: Negative for color change and rash.   Neurological: Negative for dizziness and light-headedness.   Psychiatric/Behavioral: Negative for behavioral problems and confusion. The patient is not nervous/anxious.        Objective:      Vitals:    09/09/19 0850   BP: 127/80   Weight: 86.2 kg (190 lb)   Height: 5' 8" (1.727 m)     Physical Exam   Constitutional: He is oriented to person, place, and time. He appears well-developed.   HENT:   Head: Normocephalic and atraumatic.   Nose: Nose normal.   Eyes: Conjunctivae are normal. Right eye exhibits no discharge. Left eye exhibits no discharge.   Neck: Normal range of motion. Neck supple. No tracheal deviation present. No thyromegaly present.   Cardiovascular: Normal rate and regular rhythm.    Pulmonary/Chest: Effort normal. No respiratory distress. He has no wheezes.   Abdominal: Soft. He exhibits no distension. There is no hepatosplenomegaly. There is no tenderness. There is no CVA tenderness. No hernia.   Genitourinary:   Genitourinary Comments: Patient declined exam   Musculoskeletal: Normal range of motion. He exhibits no edema.   Neurological: He is alert " and oriented to person, place, and time.   Skin: Skin is warm and dry. No rash noted. No erythema.     Psychiatric: He has a normal mood and affect. His behavior is normal. Judgment normal.       Urine dipstick shows negative for all components.  Micro exam: negative for WBC's or RBC's.      Narrative     EXAMINATION:  XR ABDOMEN AP 1 VIEW    CLINICAL HISTORY:  Calculus of kidney    TECHNIQUE:  AP View(s) of the abdomen was performed.    COMPARISON:  February 18, 2019.    FINDINGS:  Scattered stool and bowel gas particularly in the right colon.  No convincing calcifications overlying the kidneys.  Gallbladder is been removed.  Vascular and pelvic calcifications noted.  Degenerative change in the spine.   Impression       No significant abnormality identified.      Electronically signed by: Blayne Robertson MD  Date: 05/23/2019  Time: 09:10     Reviewed with patient  Assessment:       1. Nephrolithiasis          Plan:       1. Nephrolithiasis  -CT scan-- obstructing R UVJ stones x 2 with moderate hydronephrosis, trino non obstructing stones  -s/p trial of passage with Flomax--passed stones x 2  -Stone analysis: 100% Calcium oxalate dihydrate   -MALA 11/2018--resolution of right hydronephrosis, bilateral non obstructing kidney stones  -MALA 2/2019--bilateral non obstructing stones, largest on R--5 mm, L--7mm  -KUB 2/2019--stones not visualized  -KUB 5/2019--no stones visulaized  -Doing well  - POCT urinalysis, dipstick or tablet reag  - X-Ray Abdomen AP 1 View; Future            Follow up in about 1 year (around 9/9/2020) for Review X-ray.

## (undated) DEVICE — GOWN XX-LARGE

## (undated) DEVICE — GAUZE SPONGE 4X4 12PLY

## (undated) DEVICE — BLADE SHAVER 4.2 6/BX

## (undated) DEVICE — BANDAGE ACE ELASTIC 6"

## (undated) DEVICE — Device

## (undated) DEVICE — CHLORAPREP W TINT 26ML APPL

## (undated) DEVICE — SEE MEDLINE ITEM 152530

## (undated) DEVICE — BLADE SURG CARBON STEEL SZ11

## (undated) DEVICE — SYR 10CC LUER LOCK

## (undated) DEVICE — CUFF TOURNIQUET DL PRT

## (undated) DEVICE — ELECTRODE REM PLYHSV RETURN 9

## (undated) DEVICE — SUT ETHILON 4-0 BLK MONO

## (undated) DEVICE — NDL SPINAL 18GX3.5 SPINOCAN

## (undated) DEVICE — PACK ARTHROSCOPY W/ISO BAC

## (undated) DEVICE — UNDERGLOVE BIOGEL PI SZ 6.5 LF

## (undated) DEVICE — TUBE SET INFLOW/OUTFLOW

## (undated) DEVICE — BLANKET UPPER BODY 78.7X29.9IN

## (undated) DEVICE — SOL IRR NACL .9% 3000ML

## (undated) DEVICE — DRESSING XEROFORM FOIL PK 1X8

## (undated) DEVICE — GLOVE BIOGEL ORTHOPEDIC 8

## (undated) DEVICE — GLOVE SURGICAL LATEX SZ 6.5

## (undated) DEVICE — GLOVE SURGICAL LATEX SZ 8

## (undated) DEVICE — CLIPPER BLADE MOD 4406 (CAREF)

## (undated) DEVICE — SEE MEDLINE ITEM 157117

## (undated) DEVICE — PADDING CAST SOFT-ROLL 6 X 4